# Patient Record
Sex: FEMALE | HISPANIC OR LATINO | Employment: FULL TIME | URBAN - METROPOLITAN AREA
[De-identification: names, ages, dates, MRNs, and addresses within clinical notes are randomized per-mention and may not be internally consistent; named-entity substitution may affect disease eponyms.]

---

## 2017-02-14 ENCOUNTER — OFFICE VISIT (OUTPATIENT)
Dept: FAMILY MEDICINE | Facility: CLINIC | Age: 22
End: 2017-02-14
Payer: COMMERCIAL

## 2017-02-14 VITALS
SYSTOLIC BLOOD PRESSURE: 131 MMHG | WEIGHT: 145 LBS | BODY MASS INDEX: 24.75 KG/M2 | HEART RATE: 107 BPM | HEIGHT: 64 IN | OXYGEN SATURATION: 100 % | DIASTOLIC BLOOD PRESSURE: 78 MMHG | TEMPERATURE: 98 F

## 2017-02-14 DIAGNOSIS — Z32.01 PREGNANCY TEST POSITIVE: Primary | ICD-10-CM

## 2017-02-14 DIAGNOSIS — N91.2 AMENORRHEA: ICD-10-CM

## 2017-02-14 LAB — BETA HCG QUAL IFA URINE: POSITIVE

## 2017-02-14 PROCEDURE — 99213 OFFICE O/P EST LOW 20 MIN: CPT | Performed by: PREVENTIVE MEDICINE

## 2017-02-14 PROCEDURE — 84703 CHORIONIC GONADOTROPIN ASSAY: CPT | Performed by: PREVENTIVE MEDICINE

## 2017-02-14 RX ORDER — SWAB
1 SWAB, NON-MEDICATED MISCELLANEOUS DAILY
Qty: 90 EACH | Refills: 1 | Status: SHIPPED | OUTPATIENT
Start: 2017-02-14 | End: 2018-08-16

## 2017-02-14 ASSESSMENT — PAIN SCALES - GENERAL: PAINLEVEL: NO PAIN (0)

## 2017-02-14 NOTE — PROGRESS NOTES
SUBJECTIVE:                                                    Birdie Ellis is a 21 year old female who presents to clinic today for the following health issues:      Here for confirmation of pregnancy.  No abdominal pain, no dysuria or frequency, no vaginal spotting or bleeding.  No nausea or emesis.  Not taking prenatal vitamins.  No use of tobacco or alcohol. Needs referral to OB/GYN. Unplanned pregnancy.     Problem list and histories reviewed & adjusted, as indicated.  Additional history: as documented    Patient Active Problem List   Diagnosis     CARDIOVASCULAR SCREENING; LDL GOAL LESS THAN 160     Learning disability     Cold sore     Past Surgical History   Procedure Laterality Date     No history of surgery         Social History   Substance Use Topics     Smoking status: Never Smoker     Smokeless tobacco: Never Used     Alcohol use No     Family History   Problem Relation Age of Onset     DIABETES Mother      DIABETES Father      Breast Cancer Maternal Aunt      in late 30's early 40's     Breast Cancer Other 30     maternal cousin     Hypertension No family hx of      CEREBROVASCULAR DISEASE No family hx of      Thyroid Disease No family hx of      Glaucoma No family hx of      Macular Degeneration No family hx of      Depression Mother      Anxiety Disorder No family hx of      Asthma Mother      Bleeding Disorder No family hx of      Liver Disease No family hx of          Current Outpatient Prescriptions   Medication Sig Dispense Refill     Prenatal Vit-Fe Fumarate-FA (PRENATAL MULTIVITAMIN  WITH IRON) 28-0.8 MG TABS Take 1 tablet by mouth daily 90 each 1     Allergies   Allergen Reactions     Pollen Extract      BP Readings from Last 3 Encounters:   02/14/17 131/78   12/12/16 124/67   09/19/16 118/79    Wt Readings from Last 3 Encounters:   02/14/17 145 lb (65.8 kg)   12/12/16 147 lb (66.7 kg)   09/19/16 138 lb 9.6 oz (62.9 kg)                    ROS:  Constitutional, HEENT, cardiovascular,  "pulmonary, gi and gu systems are negative, except as otherwise noted.    OBJECTIVE:                                                    /78 (BP Location: Right arm, Cuff Size: Adult Regular)  Pulse 107  Temp 98  F (36.7  C) (Oral)  Ht 5' 3.5\" (1.613 m)  Wt 145 lb (65.8 kg)  LMP 01/03/2017 (Exact Date)  SpO2 100%  Breastfeeding? Unknown  BMI 25.28 kg/m2  Body mass index is 25.28 kg/(m^2).  GENERAL APPEARANCE: healthy, alert and no distress  EYES: Eyes grossly normal to inspection and conjunctivae and sclerae normal  NECK: no adenopathy and no asymmetry, masses, or scars  RESP: Clear to auscultation bilaterally   CV: regular rates and rhythm, normal S1 S2, no S3 or S4 and no murmur, click or rub  ABDOMEN: no rebound or guarding   MS: extremities normal- no gross deformities noted  SKIN: no suspicious lesions or rashes  NEURO: Normal strength and tone, mentation intact and speech normal  PSYCH: mentation appears normal    Diagnostic test results:  Diagnostic Test Results:  Results for orders placed or performed in visit on 02/14/17 (from the past 24 hour(s))   Beta HCG qual IFA urine   Result Value Ref Range    Beta HCG Qual IFA Urine Positive (A) NEG        ASSESSMENT/PLAN:                                                    1. Pregnancy test positive  -LMP 1/3/17  -Not using any contraception  -Primigravida  -RODERICK 10/10/17  -6 weeks today   - OB/GYN REFERRAL  -Information on pregnancy provided   - Prenatal Vit-Fe Fumarate-FA (PRENATAL MULTIVITAMIN  WITH IRON) 28-0.8 MG TABS; Take 1 tablet by mouth daily  Dispense: 90 each; Refill: 1    2. Amenorrhea  - Beta HCG qual IFA urine      Follow up with Provider - Establish with OB/GYN for prenatal care    Due for pap smear, will get with GYN     Cassidy Hayes MD MPH    [facility]    "

## 2017-02-14 NOTE — PROGRESS NOTES
Results discussed directly with patient while patient was present. Any further details documented in the note.   Cassidy Hayes MD

## 2017-02-14 NOTE — PATIENT INSTRUCTIONS
At [facility], we strive to deliver an exceptional experience to you, every time we see you.    If you receive a survey in the mail, please send us back your thoughts. We really do value your feedback.    Thank you for visiting [facility]    Normal or non-critical lab and imaging results will be communicated to you by MyChart, letter or phone within 7 days.  If you do not hear from us within 10 days, please call the clinic. If you have a critical or abnormal lab result, we will notify you by phone as soon as possible.     If you have any questions regarding your visit please contact:     Team [facility]  Clinic Hours Telephone Number   Dr. Jelly Alatorre   7am-7pm  Monday through Thursday  7am-5pm Friday [phone]  Alyssa MEDEROS RN   Pharmacy 8:30am-9pm Monday-Friday    9am-5pm Saturday-Sunday [phone]   Urgent Care 11am-9pm Monday-Friday        9am-5pm Saturday-Sunday [phone]     After hours, weekend or if you need to make an appointment with your primary provider please call [phone].   After Hours nurse advise: call [facility]: [phone]    Use [employer] (secure email communication and access to your chart) to send your primary care provider a message or make an appointment. Ask someone on your Team how to sign up for [employer]. To log on to [employer] or for more information in [employer] please visit the website at [email or web address].   As of October 8, 2013, all password changes, disabled accounts, or ID changes in [employer]/MyHealth will be done by our Access Services Department.   If you need help with your account or password, call: [phone]. Clinic staff no longer has the ability to change passwords.     Embarazo    El examen que se le hizo hoy indica que usted está embarazada.  Síntomas de embarazo  Raymond el embarazo, las  hormonas de oh cuerpo cambian. [place] provoca cambios físicos y emocionales. Es normal. Saber qué esperar la tranquilizará y sabrá cuándo buscar ayuda si tiene algún problema. Estos son algunos de los síntomas más comunes:    Náuseas por la mañana. En realidad, las náuseas pueden darse en cualquier momento del día o la noche    Sensibilidad e inflamación de los senos    Necesidad de orinar con más frecuencia    Fatiga o cansancio    Mareo    Indigestión o acidez estomacal    Deseos de comer ciertos alimentos o sentir asco por otros    Estreñimiento    Cambios emocionales. Estos cambios pueden ir desde ansiedad a excitación y depresión.  Indicaciones para tener un embarazo saludable  Aquí tiene algunas cosas que puede hacer para asegurarse de tener un bebé triston:    Descanse cuando se sienta cansada. Especialmente, angela los últimos meses del embarazo.    Maria T más líquidos. Oh cuerpo necesita más líquidos de los que usted quizás acostumbre a consumir. Maria T entre ocho y williams vasos de jugo, leche o agua todos los días.    Coma de manera balanceada. Coma a horarios regulares para darle a oh cuerpo proteínas suficientes. Es de esperarse que aumente alrededor de 30 libras (15 kilos) angela el embarazo. No intente hacer dieta ni bajar de peso mientras esté embarazada.    Felton marie vitamina prenatal al día. Eso le ayudará a satisfacer las necesidades de mayor nutrición que tiene el embarazo.    No tome ningún otro medicamento mientras esté embarazada a menos que oh médico le indique hacerlo. No tome ni siquiera medicamentos recetados ni de venta [place]. Muchos medicamentos pueden dañar al bebé que está creciendo en oh vientre.    Si tiene náuseas o vómito, no coma alimentos grasosos ni fritos. Coma varias comidas más pequeñas a lo jori del día en lugar de gil comidas grandes.    Si fuma, debe dejarlo. La nicotina que usted respira va directo al bebé.    No tome alcohol, ni siquiera en cantidades moderadas. Si dominga a diario,  perjudicará la demetria de oh bebé y puede provocarle daño cerebral permanente.    No use drogas recreativas, especialmente, cocaína, crack y heroína. Esas dañarán a oh bebé. Evite también la marihuana.    Si estaba consumiendo drogas recreativas o medicamentos recetados cuando se enteró de que estaba embarazada, hable con oh proveedor de atención médica respecto de los posibles efectos que ello podría tener sobre el bebé que está creciendo.    Si tiene algún problema médico para el que necesite adrián medicamentos, hable con oh médico.  Visita de control  Llame a oh proveedor de atención médica para coordinar la atención prenatal. La atención médica prenatal es importante. Puede agustina a un médico de bill, a un especialista en embarazo (obstetra) o a un clínico de atención médica primaria.   Cuándo debe buscar atención médica?  Llame a oh proveedor de atención médica de inmediato si sucede cualquiera de las siguientes cosas:    Sangrado vaginal    Dolor en oh abdomen o en oh espalda que es moderado o muy yvette    Vomita mucho o no puede mantener ningún líquido en el estómago por seis horas    Ardor al orinar    Dolor de brittany, mareos o rápido aumento de peso    Fiebre    Cambios en oh visión o visión borrosa    [id number] [employer]. [address] [place] [address], [place] [address]. Todos los derechos reservados. Esta información no pretende sustituir la atención médica profesional. Sólo oh médico puede diagnosticar y tratar un problema de demetria.        Pregnancy: Common Questions  There are plenty of myths and  old wives  tales  surrounding pregnancy. You may need help  fact from fiction. On this sheet, you ll find answers to a few common questions. If you have other questions, talk with your healthcare provider.    Will working harm my baby?  In most cases, working throughout your pregnancy is not harmful at all. There may be concerns if the job involves dangerous machinery or chemicals, lifting,  or standing for very long periods of time. Talk to your healthcare provider and employer about your particular job and pregnancy.  Is it safe to have sexual relations during pregnancy?  Yes, unless you are specifically instructed not to by your healthcare provider.  Why can t I change the cat litter box?  Cats carry a disease called toxoplasmosis. In adult humans, it shows up as a mild infection of the blood and organs. If you are infected during pregnancy, the baby s brain and eyes could be damaged. To be safe, have someone else change the litter. If you must handle it, wear a paper mask over your nose and mouth. Also, wear gloves and wash your hands afterward.  Which medicines are safe?  No prescription or over-the-counter medicine is safe for everyone all of the time. But sometimes medicines are needed.  Be sure your healthcare provider knows you are pregnant. Then use only the medicines he or she advises you to take.  Is it true that I can overheat my baby?  Yes. To avoid making your baby too warm:    Don t sit in a Jacuzzi. A long, warm bath is fine, but not in water over 100 F.    Exercise less intensely if you feel fatigued. Base your workout on how you feel, not your heart rate. Heart rates aren t a good way to measure effort during pregnancy.  Can I lift and carry safely?  Yes, if your healthcare provider doesn t tell you otherwise. Learn to lift and carry safely to avoid injury and reduce back pain during pregnancy. To protect your back:    Bend at the knees to bring the load nearer.    Get a good . Test the weight of the load.    Tighten your abdomen. Exhale as you lift.    Lift with your legs, not with your back.    Carry the load close to your body.    Hold the load so you can see where you are going.  What if I get sick?  Most women get sick at least once during pregnancy. Talk with your healthcare provider if you do. Most likely it will not affect your pregnancy. Get plenty of rest and fluids, and  eat what you can. Talk to your healthcare provider before taking any medicines.    [id number] [employer]. [address] [place] [address], [place] [address]. All rights reserved. This information is not intended as a substitute for professional medical care. Always follow your healthcare professional's instructions.        Pregnancy    Your exam today shows that you are pregnant.  Pregnancy symptoms  During pregnancy your body s hormones change. This causes physical and emotional changes. This is normal. Knowing what to expect is important for your piece of mind and so you know when to seek help for a problem. Here are some of the most common symptoms:    Morning sickness or nausea. This can happen any time of the day or night.    Tender, swollen breasts    Need to urinate frequently    Tiredness or fatigue    Dizziness    Indigestion or heartburn    Food cravings or turn-offs    Constipation    Emotional changes. This can range from anxiety to excitement to depression.  Guidelines for a healthy pregnancy  Here are things you can do to help make sure your baby is born healthy:    Rest when you feel tired. This is especially true in the later months of pregnancy.    Drink more fluids. Your body needs more fluids than you may be used to. Drink 8 to10 glasses of juice, milk, or water every day.    Eat well-balanced meals. Eat at regular times to give your body enough protein. You can expect to gain about 30 pounds during the pregnancy. Don t try to diet or lose weight while you are pregnant.    Take 1 prenatal vitamin every day. This helps give you meet the extra nutritional needs of pregnancy.    Don t take any other medicine during your pregnancy unless your doctor tells you to. This includes prescription medicines and those you buy over the counter. Many drugs can harm the growing baby.    If you have nausea or vomiting, don t eat greasy or fried foods. Eat several smaller meals throughout the day rather than 3  large meals.    If you smoke, you must stop. The nicotine you breathe in goes right to the baby.    Stay away from alcohol, even in moderate amounts. Daily drinking will harm your baby and can cause permanent brain damage.    Don t use recreational drugs, especially cocaine, crack, and heroin. These will harm your baby. Also avoid marijuana.    If you were using recreational drugs or prescribed medicine when you found out that you were pregnant, talk with your health care provider about possible effects on your growing baby.    If you have medical problems that you need to take medicine for, talk with your doctor about this.  Follow-up care  Call your health care provider to arrange for prenatal care. Prenatal care is important. You can see your family doctor, a pregnancy specialist (obstetrician), or a primary care clinic.  When to seek medical advice  Call your health care provider right away if any of these occur:    Vaginal bleeding    Pain in your belly (abdomen) or back that is moderate or severe    Lots of vomiting, or  you can t keep any fluids down for 6 hours    Burning feeling when you urinate    Headache, dizziness, or rapid weight gain    Fever    Vision changes or blurred vision       [phone] [employer]. [address] [place] [address]. All rights reserved. This information is not intended as a substitute for professional medical care. Always follow your healthcare professional's instructions.

## 2017-02-14 NOTE — NURSING NOTE
"Chief Complaint   Patient presents with     Confirmation Of Pregnancy       Initial /78 (BP Location: Right arm, Cuff Size: Adult Regular)  Pulse 107  Temp 98  F (36.7  C) (Oral)  Ht 5' 3.5\" (1.613 m)  Wt 145 lb (65.8 kg)  LMP 01/03/2017 (Exact Date)  SpO2 100%  Breastfeeding? Unknown  BMI 25.28 kg/m2 Estimated body mass index is 25.28 kg/(m^2) as calculated from the following:    Height as of this encounter: 5' 3.5\" (1.613 m).    Weight as of this encounter: 145 lb (65.8 kg).  Medication Reconciliation: complete   Sunitha CAMERON        "

## 2017-02-14 NOTE — MR AVS SNAPSHOT
After Visit Summary   2/14/2017    Birdie Ellis    MRN: [id number]           Patient Information     Date Of Birth          1995        Visit Information        Provider Department      2/14/2017 4:20 PM Cassidy Hayes MD [facility]        Today's Diagnoses     Pregnancy test positive    -  1    Amenorrhea          Care Instructions    At [facility], we strive to deliver an exceptional experience to you, every time we see you.    If you receive a survey in the mail, please send us back your thoughts. We really do value your feedback.    Thank you for visiting [facility]    Normal or non-critical lab and imaging results will be communicated to you by MyChart, letter or phone within 7 days.  If you do not hear from us within 10 days, please call the clinic. If you have a critical or abnormal lab result, we will notify you by phone as soon as possible.     If you have any questions regarding your visit please contact:     Team [facility]  Clinic Hours Telephone Number   Dr. Jelly Alatorre   7am-7pm  Monday through Thursday  7am-5pm Friday [phone]  Alyssa MEDEROS RN   Pharmacy 8:30am-9pm Monday-Friday    9am-5pm Saturday-Sunday [phone]   Urgent Care 11am-9pm Monday-Friday        9am-5pm Saturday-Sunday [phone]     After hours, weekend or if you need to make an appointment with your primary provider please call [phone].   After Hours nurse advise: call [facility]: [phone]    Use SurIDxhart (secure email communication and access to your chart) to send your primary care provider a message or make an appointment. Ask someone on your Team how to sign up for YelloYello. To log on to [email or web address] or for more information in [employer]) please visit the website at [email or web address].   As of October 8,  2013, all password changes, disabled accounts, or ID changes in CAISt/MyHealth will be done by our Access Services Department.   If you need help with your account or password, call: [phone]. Clinic staff no longer has the ability to change passwords.     Embarazo    El examen que se le hizo hoy indica que usted está embarazada.  Síntomas de embarazo  Angela el embarazo, las hormonas de oh cuerpo cambian. [place] provoca cambios físicos y emocionales. Es normal. Saber qué esperar la tranquilizará y sabrá cuándo buscar ayuda si tiene algún problema. Estos son algunos de los síntomas más comunes:    Náuseas por la mañana. En realidad, las náuseas pueden darse en cualquier momento del día o la noche    Sensibilidad e inflamación de los senos    Necesidad de orinar con más frecuencia    Fatiga o cansancio    Mareo    Indigestión o acidez estomacal    Deseos de comer ciertos alimentos o sentir asco por otros    Estreñimiento    Cambios emocionales. Estos cambios pueden ir desde ansiedad a excitación y depresión.  Indicaciones para tener un embarazo saludable  Aquí tiene algunas cosas que puede hacer para asegurarse de tener un bebé triston:    Descanse cuando se sienta cansada. Especialmente, angela los últimos meses del embarazo.    Maria T más líquidos. Oh cuerpo necesita más líquidos de los que usted quizás acostumbre a consumir. Maria T entre ocho y williams vasos de jugo, leche o agua todos los días.    Coma de manera balanceada. Coma a horarios regulares para darle a oh cuerpo proteínas suficientes. Es de esperarse que aumente alrededor de 30 libras (15 kilos) angela el embarazo. No intente hacer dieta ni bajar de peso mientras esté embarazada.    [place] marie vitamina prenatal al día. Eso le ayudará a satisfacer las necesidades de mayor nutrición que tiene el embarazo.    No tome ningún otro medicamento mientras esté embarazada a menos que oh médico le indique hacerlo. No tome ni siquiera medicamentos recetados ni de  venta india. Muchos medicamentos pueden dañar al bebé que está creciendo en oh vientre.    Si tiene náuseas o vómito, no coma alimentos grasosos ni fritos. Coma varias comidas más pequeñas a lo jori del día en lugar de gil comidas grandes.    Si fuma, debe dejarlo. La nicotina que usted respira va directo al bebé.    No tome alcohol, ni siquiera en cantidades moderadas. Si dominga a diario, perjudicará la demetria de oh bebé y puede provocarle daño cerebral permanente.    No use drogas recreativas, especialmente, cocaína, crack y heroína. Esas dañarán a oh bebé. Evite también la marihuana.    Si estaba consumiendo drogas recreativas o medicamentos recetados cuando se enteró de que estaba embarazada, hable con oh proveedor de atención médica respecto de los posibles efectos que ello podría tener sobre el bebé que está creciendo.    Si tiene algún problema médico para el que necesite adrián medicamentos, hable con oh médico.  Visita de control  Llame a oh proveedor de atención médica para coordinar la atención prenatal. La atención médica prenatal es importante. Puede agustina a un médico de bill, a un especialista en embarazo (obstetra) o a un clínico de atención médica primaria.   Cuándo debe buscar atención médica?  Llame a oh proveedor de atención médica de inmediato si sucede cualquiera de las siguientes cosas:    Sangrado vaginal    Dolor en oh abdomen o en oh espalda que es moderado o muy yvette    Vomita mucho o no puede mantener ningún líquido en el estómago por seis horas    Ardor al orinar    Dolor de brittany, mareos o rápido aumento de peso    Fiebre    Cambios en oh visión o visión borrosa    4529-4585 [employer]. [address] [place] [address], [place] [address]. Todos los derechos reservados. Esta información no pretende sustituir la atención médica profesional. Sólo oh médico puede diagnosticar y tratar un problema de demetria.        Pregnancy: Common Questions  There are plenty of myths and  old wives   tales  surrounding pregnancy. You may need help  fact from fiction. On this sheet, you ll find answers to a few common questions. If you have other questions, talk with your healthcare provider.    Will working harm my baby?  In most cases, working throughout your pregnancy is not harmful at all. There may be concerns if the job involves dangerous machinery or chemicals, lifting, or standing for very long periods of time. Talk to your healthcare provider and employer about your particular job and pregnancy.  Is it safe to have sexual relations during pregnancy?  Yes, unless you are specifically instructed not to by your healthcare provider.  Why can t I change the cat litter box?  Cats carry a disease called toxoplasmosis. In adult humans, it shows up as a mild infection of the blood and organs. If you are infected during pregnancy, the baby s brain and eyes could be damaged. To be safe, have someone else change the litter. If you must handle it, wear a paper mask over your nose and mouth. Also, wear gloves and wash your hands afterward.  Which medicines are safe?  No prescription or over-the-counter medicine is safe for everyone all of the time. But sometimes medicines are needed.  Be sure your healthcare provider knows you are pregnant. Then use only the medicines he or she advises you to take.  Is it true that I can overheat my baby?  Yes. To avoid making your baby too warm:    Don t sit in a Jacuzzi. A long, warm bath is fine, but not in water over 100 F.    Exercise less intensely if you feel fatigued. Base your workout on how you feel, not your heart rate. Heart rates aren t a good way to measure effort during pregnancy.  Can I lift and carry safely?  Yes, if your healthcare provider doesn t tell you otherwise. Learn to lift and carry safely to avoid injury and reduce back pain during pregnancy. To protect your back:    Bend at the knees to bring the load nearer.    Get a good . Test the weight  of the load.    Tighten your abdomen. Exhale as you lift.    Lift with your legs, not with your back.    Carry the load close to your body.    Hold the load so you can see where you are going.  What if I get sick?  Most women get sick at least once during pregnancy. Talk with your healthcare provider if you do. Most likely it will not affect your pregnancy. Get plenty of rest and fluids, and eat what you can. Talk to your healthcare provider before taking any medicines.    6275-0450 [employer]. [address] [place] [address], [place] [address]. All rights reserved. This information is not intended as a substitute for professional medical care. Always follow your healthcare professional's instructions.        Pregnancy    Your exam today shows that you are pregnant.  Pregnancy symptoms  During pregnancy your body s hormones change. This causes physical and emotional changes. This is normal. Knowing what to expect is important for your piece of mind and so you know when to seek help for a problem. Here are some of the most common symptoms:    Morning sickness or nausea. This can happen any time of the day or night.    Tender, swollen breasts    Need to urinate frequently    Tiredness or fatigue    Dizziness    Indigestion or heartburn    Food cravings or turn-offs    Constipation    Emotional changes. This can range from anxiety to excitement to depression.  Guidelines for a healthy pregnancy  Here are things you can do to help make sure your baby is born healthy:    Rest when you feel tired. This is especially true in the later months of pregnancy.    Drink more fluids. Your body needs more fluids than you may be used to. Drink 8 to10 glasses of juice, milk, or water every day.    Eat well-balanced meals. Eat at regular times to give your body enough protein. You can expect to gain about 30 pounds during the pregnancy. Don t try to diet or lose weight while you are pregnant.    Take 1 prenatal vitamin every  day. This helps give you meet the extra nutritional needs of pregnancy.    Don t take any other medicine during your pregnancy unless your doctor tells you to. This includes prescription medicines and those you buy over the counter. Many drugs can harm the growing baby.    If you have nausea or vomiting, don t eat greasy or fried foods. Eat several smaller meals throughout the day rather than 3 large meals.    If you smoke, you must stop. The nicotine you breathe in goes right to the baby.    Stay away from alcohol, even in moderate amounts. Daily drinking will harm your baby and can cause permanent brain damage.    Don t use recreational drugs, especially cocaine, crack, and heroin. These will harm your baby. Also avoid marijuana.    If you were using recreational drugs or prescribed medicine when you found out that you were pregnant, talk with your health care provider about possible effects on your growing baby.    If you have medical problems that you need to take medicine for, talk with your doctor about this.  Follow-up care  Call your health care provider to arrange for prenatal care. Prenatal care is important. You can see your family doctor, a pregnancy specialist (obstetrician), or a primary care clinic.  When to seek medical advice  Call your health care provider right away if any of these occur:    Vaginal bleeding    Pain in your belly (abdomen) or back that is moderate or severe    Lots of vomiting, or  you can t keep any fluids down for 6 hours    Burning feeling when you urinate    Headache, dizziness, or rapid weight gain    Fever    Vision changes or blurred vision       9790-7395 [employer]. [address] [place] [address], [place] [address]. All rights reserved. This information is not intended as a substitute for professional medical care. Always follow your healthcare professional's instructions.              Follow-ups after your visit        Additional Services     OB/GYN REFERRAL        "Your provider has referred you to:  FM[facility]: [facility] [phone]   [email or web address]     Please be aware that coverage of these services is subject to the terms and limitations of your health insurance plan.  Call member services at your health plan with any benefit or coverage questions.      Please bring the following with you to your appointment:    (1) Any X-Rays, CTs or MRIs which have been performed.  Contact the facility where they were done to arrange for  prior to your scheduled appointment.   (2) List of current medications   (3) This referral request   (4) Any documents/labs given to you for this referral                  Who to contact     If you have questions or need follow up information about today's clinic visit or your schedule please contact [facility] [place] directly at [phone].  Normal or non-critical lab and imaging results will be communicated to you by MyChart, letter or phone within 4 business days after the clinic has received the results. If you do not hear from us within 7 days, please contact the clinic through MyChart or phone. If you have a critical or abnormal lab result, we will notify you by phone as soon as possible.  Submit refill requests through Angle or call your pharmacy and they will forward the refill request to us. Please allow 3 business days for your refill to be completed.          Additional Information About Your Visit        Angle Information     Angle lets you send messages to your doctor, view your test results, renew your prescriptions, schedule appointments and more. To sign up, go to [email or web address] . Click on \"Log in\" on the left side of the screen, which will take you to the Welcome page. Then click on \"Sign up Now\" on the right side of the page.     You will be asked to enter the access code listed below, as well as some personal information. Please " "follow the directions to create your username and password.     Your access code is: [email or web address]  Expires: 5/15/2017  4:43 PM     Your access code will  in 90 days. If you need help or a new code, please call your [facility] clinic or [phone].        Care EveryWhere ID     This is your Care EveryWhere ID. This could be used by other organizations to access your [facility] medical records  [id number]        Your Vitals Were     Pulse Temperature Height Last Period Pulse Oximetry Breastfeeding?    107 98  F (36.7  C) (Oral) 5' 3.5\" (1.613 m) 2017 (Exact Date) 100% Unknown    BMI (Body Mass Index)                   25.28 kg/m2            Blood Pressure from Last 3 Encounters:   17 131/78   16 124/67   16 118/79    Weight from Last 3 Encounters:   17 145 lb (65.8 kg)   16 147 lb (66.7 kg)   16 138 lb 9.6 oz (62.9 kg)              We Performed the Following     Beta HCG qual IFA urine     OB/GYN REFERRAL          Today's Medication Changes          These changes are accurate as of: 17  4:43 PM.  If you have any questions, ask your nurse or doctor.               Start taking these medicines.        Dose/Directions    prenatal multivitamin  with iron 28-0.8 MG Tabs   Used for:  Pregnancy test positive   Started by:  Cassidy Hayes MD        Dose:  1 tablet   Take 1 tablet by mouth daily   Quantity:  90 each   Refills:  1         Stop taking these medicines if you haven't already. Please contact your care team if you have questions.     levonorgestrel-ethinyl estradiol 0.15-30 MG-MCG per tablet   Commonly known as:  NORDETTE   Stopped by:  Cassidy Hayes MD           terbinafine 250 MG tablet   Commonly known as:  lamISIL   Stopped by:  Cassidy Hayes MD           valACYclovir 1000 mg tablet   Commonly known as:  VALTREX   Stopped by:  Cassidy Hayes MD                Where to get your medicines      These medications were sent to [id number] - " [place], [place] - [address]  [address], [facility] [place] [address]    Hours:  24-hours Phone:  [phone]     prenatal multivitamin  with iron 28-0.8 MG Tabs                Primary Care Provider Office Phone # Fax #    Jolie An Loaiza PA-C [phone]       [facility] [address]  [facility] [place] [address]        Thank you!     Thank you for choosing [facility]  for your care. Our goal is always to provide you with excellent care. Hearing back from our patients is one way we can continue to improve our services. Please take a few minutes to complete the written survey that you may receive in the mail after your visit with us. Thank you!             Your Updated Medication List - Protect others around you: Learn how to safely use, store and throw away your medicines at www.disposemymeds.org.          This list is accurate as of: 2/14/17  4:43 PM.  Always use your most recent med list.                   Brand Name Dispense Instructions for use    prenatal multivitamin  with iron 28-0.8 MG Tabs     90 each    Take 1 tablet by mouth daily

## 2017-03-07 DIAGNOSIS — Z32.01 PREGNANCY TEST POSITIVE: Primary | ICD-10-CM

## 2017-06-20 ENCOUNTER — OFFICE VISIT (OUTPATIENT)
Dept: FAMILY MEDICINE | Facility: CLINIC | Age: 22
End: 2017-06-20
Payer: MEDICAID

## 2017-06-20 VITALS
HEIGHT: 64 IN | BODY MASS INDEX: 26.63 KG/M2 | WEIGHT: 156 LBS | OXYGEN SATURATION: 99 % | SYSTOLIC BLOOD PRESSURE: 112 MMHG | DIASTOLIC BLOOD PRESSURE: 69 MMHG | RESPIRATION RATE: 17 BRPM | TEMPERATURE: 98 F | HEART RATE: 87 BPM

## 2017-06-20 DIAGNOSIS — Z33.1 PREGNANCY, INCIDENTAL: ICD-10-CM

## 2017-06-20 DIAGNOSIS — K06.9 GINGIVA DISORDER: Primary | ICD-10-CM

## 2017-06-20 PROCEDURE — 99213 OFFICE O/P EST LOW 20 MIN: CPT | Performed by: PHYSICIAN ASSISTANT

## 2017-06-20 ASSESSMENT — PAIN SCALES - GENERAL: PAINLEVEL: MODERATE PAIN (4)

## 2017-06-20 NOTE — NURSING NOTE
"Chief Complaint   Patient presents with     Mouth/Lip Problem       Initial /69 (BP Location: Left arm, Patient Position: Chair, Cuff Size: Adult Regular)  Pulse 87  Temp 98  F (36.7  C) (Oral)  Resp 17  Ht 5' 3.5\" (1.613 m)  Wt 156 lb (70.8 kg)  LMP 01/03/2017 (Exact Date)  SpO2 99%  Breastfeeding? No  BMI 27.2 kg/m2 Estimated body mass index is 27.2 kg/(m^2) as calculated from the following:    Height as of this encounter: 5' 3.5\" (1.613 m).    Weight as of this encounter: 156 lb (70.8 kg).  Medication Reconciliation: complete     Selma Parker MA       "

## 2017-06-20 NOTE — PROGRESS NOTES
"  SUBJECTIVE:                                                    Birdie Ellis is a 21 year old female who presents to clinic today for the following health issues:    Concern - Gingival discomfort and bleeding     Onset: 5 days    Description:   Inside lower lip    Intensity: moderate    Progression of Symptoms:  same    Accompanying Signs & Symptoms:  Discomfort and bleeding gums when brushing teeth and flossing        Previous history of similar problem:   no    Precipitating factors:   Worsened by: brushing teeth and eating    Alleviating factors:  Improved by: none       Therapies Tried and outcome: salt water rinse and mouth wash    Patient presents for evaluation of intermittent gingival discomfort bleeding for the past 5 days. Patient reports she has bleeding onset when she is brushing her teeth and flossing along with associated discomfort. Also will experience these symptoms when eating certain foods such as chips. Patient feels as if her gums are \"swollen\" and irritated more than usual. She has tried salt water gargles and baking soda with lime without much improvement. Also tried using mouthwash but did not continue using this as it caused \"stinging and burning\" in inflamed areas. Patient does not follow with dentist 2x per year; states it has been at least 1-2 years since she last had dentist appointment. Denies similar presentation in the past. Denies tooth pain, ulcers, lesions, fever, chills, sore throat, tongue swelling, or difficulty breathing. She does not smoke cigarettes.     Patient reports she had a miscarriage in February 2017. She recently found out she is pregnant again, states she is ~5 weeks along. She is following with OBGYN. Reports she is taking prenatal vitamins. Complains of some back pain related to pregnancy and wondering what medications are safe to take at this time.     Allergies   Allergen Reactions     Pollen Extract        Past Medical History:   Diagnosis Date     " "Learning disability          Current Outpatient Prescriptions on File Prior to Visit:  Prenatal Vit-Fe Fumarate-FA (PRENATAL MULTIVITAMIN  WITH IRON) 28-0.8 MG TABS Take 1 tablet by mouth daily     No current facility-administered medications on file prior to visit.     Social History   Substance Use Topics     Smoking status: Never Smoker     Smokeless tobacco: Never Used     Alcohol use No       ROS:  General: negative for fever or chills   Ears: negative for ear pain   Eyes: negative for eye pain or discharge   Nose: negative for rhinorrhea   Mouth: positive for gingival bleeding and discomfort as above, negative for sore throat or mucosal lesions  Resp: negative for difficulty breathing   CV: negative for chest pain  ABD: negative for abd pain, nausea, or vomiting   Neurologic:negative for headache    OBJECTIVE:  /69 (BP Location: Left arm, Patient Position: Chair, Cuff Size: Adult Regular)  Pulse 87  Temp 98  F (36.7  C) (Oral)  Resp 17  Ht 5' 3.5\" (1.613 m)  Wt 156 lb (70.8 kg)  LMP 01/03/2017 (Exact Date)  SpO2 99%  Breastfeeding? No  BMI 27.2 kg/m2   General:   awake, alert, and cooperative.  NAD.   Head: Normocephalic, atraumatic.   Eyes: Conjunctiva clear, PERRL.  Ears: Pearly grey TMs bilaterally.  Nose: Nares patent without discharge.   Mouth/Throat: Erythematous and edematous gingival tissues with plaque present, mildly tender to palpation, no bleeding, no abscess or fluctuance, no mucosal lesions or ulcerations.   Neck: Supple, no adenopathy.   Heart: Regular rate and rhythm. No murmur.  Lungs: Chest is clear; no wheezes or rales.   Neuro: Alert and oriented - normal speech.    ASSESSMENT:  Gingivitis   Pregnancy, first trimester     PLAN:   Presentation consistent with gingivitis. Explained to patient this may be exacerbated in setting of pregnancy secondary to hormonal shifts causing increased gingival inflammation.   Recommended salt water gargles and using alcohol-free mouthwash. " Also discussed proper dental hygiene including teeth brushing and flossing daily.  Instructed patient to follow up with dentist as soon as possible for thorough cleaning and evaluation.     Patient is experiencing some back pain likely related to pregnancy. Recommended using tylenol as this is safe. Also instructed her to continue taking prenatal vitamins. Advised her to follow up with OBGYN as scheduled.     Advised about symptoms which might herald more serious problems.    Patient verbalized understanding and agreement with treatment and discharge plan.     Joselito Nichole PA-C

## 2017-06-20 NOTE — PATIENT INSTRUCTIONS
How to contact your care team: [phone] Pharmacy [phone]   MD HERSON VENTURA PA-C CHRIS JONES, PA-C NAM HO, MD JONATHAN BATES, MD ARVIN VOCAL, MD    Clinic hours M-Th 7am-7pm Fri 7am-5pm.   Urgent care M-F 11am-9pm  Sat/Sun 9am-5pm.   Pharmacy   Mon-Th:  8:00am-8pm   Fri:  8:00am-6:00pm  Sat/Sun  8:00am-5:00 pm       Stages of Periodontal Disease  Periodontal disease is an infection of the gums and tissues supporting the teeth. If not treated, it often gets worse. Bone damage and tooth loss can occur. Regular self-care and dental visits can help prevent or control periodontal disease.     Gngivitis       Periodontitis       Advanced periodontitis      Gingivitis  This is the mildest form of periodontal disease. The gum becomes irritated and swollen (inflamed). The space between the gum and tooth gets deeper, forming a pocket. Gums may become red and may bleed. Or, there may be no symptoms. Left untreated, it can progress to periodontitis.  Periodontitis  Infection and inflammation spread to the bone supporting the teeth. Gums may recede (shrink back) from the teeth. Pockets between the teeth can get deeper and harder to clean. Redness, swelling, and bleeding may develop or get worse. Infection begins to destroy the bone. As bone is destroyed, teeth may start to feel loose.  Advanced periodontitis  As periodontitis advances, pockets deepen even more and can fill with pus. Around the roots of the teeth, the gums may start to swell. Bone loss continues. The teeth may feel sensitive to heat or cold and may hurt when brushed. Teeth loosen more. In some cases, teeth may need to be removed to keep the disease from spreading.  Date Last Reviewed: 7/3/2015    7593-8358 The "[employer]". [address] [place] [address], [place] [address]. All rights reserved. This information is not intended as a substitute for professional medical care. Always follow your healthcare professional's  instructions.

## 2017-06-20 NOTE — MR AVS SNAPSHOT
After Visit Summary   6/20/2017    Birdie Ellis    MRN: [id number]           Patient Information     Date Of Birth          1995        Visit Information        Provider Department      6/20/2017 11:00 AM Roberto Nichole PA [facility]        Care Instructions    How to contact your care team: [phone] Pharmacy [phone]   MD HERSON VENTURA PA-C CHRIS JONES, PA-C NAM HO, MD JONATHAN BATES, MD ARVIN VOCAL, MD    Clinic hours M-Th 7am-7pm Fri 7am-5pm.   Urgent care M-F 11am-9pm  Sat/Sun 9am-5pm.   Pharmacy   Mon-Th:  8:00am-8pm   Fri:  8:00am-6:00pm  Sat/Sun  8:00am-5:00 pm       Stages of Periodontal Disease  Periodontal disease is an infection of the gums and tissues supporting the teeth. If not treated, it often gets worse. Bone damage and tooth loss can occur. Regular self-care and dental visits can help prevent or control periodontal disease.     Gngivitis       Periodontitis       Advanced periodontitis      Gingivitis  This is the mildest form of periodontal disease. The gum becomes irritated and swollen (inflamed). The space between the gum and tooth gets deeper, forming a pocket. Gums may become red and may bleed. Or, there may be no symptoms. Left untreated, it can progress to periodontitis.  Periodontitis  Infection and inflammation spread to the bone supporting the teeth. Gums may recede (shrink back) from the teeth. Pockets between the teeth can get deeper and harder to clean. Redness, swelling, and bleeding may develop or get worse. Infection begins to destroy the bone. As bone is destroyed, teeth may start to feel loose.  Advanced periodontitis  As periodontitis advances, pockets deepen even more and can fill with pus. Around the roots of the teeth, the gums may start to swell. Bone loss continues. The teeth may feel sensitive to heat or cold and may hurt when brushed. Teeth loosen more. In some cases, teeth may  "need to be removed to keep the disease from spreading.  Date Last Reviewed: 7/3/2015    2288-0521 [employer]. [address] [place] [address], [place] [address]. All rights reserved. This information is not intended as a substitute for professional medical care. Always follow your healthcare professional's instructions.                Follow-ups after your visit        Follow-up notes from your care team     Return if symptoms worsen or fail to improve.      Who to contact     If you have questions or need follow up information about today's clinic visit or your schedule please contact [facility] directly at [phone].  Normal or non-critical lab and imaging results will be communicated to you by MyChart, letter or phone within 4 business days after the clinic has received the results. If you do not hear from us within 7 days, please contact the clinic through [employer] or phone. If you have a critical or abnormal lab result, we will notify you by phone as soon as possible.  Submit refill requests through [employer] or call your pharmacy and they will forward the refill request to us. Please allow 3 business days for your refill to be completed.          Additional Information About Your Visit        MyChart Information     [employer] lets you send messages to your doctor, view your test results, renew your prescriptions, schedule appointments and more. To sign up, go to [email or web address] . Click on \"Log in\" on the left side of the screen, which will take you to the Welcome page. Then click on \"Sign up Now\" on the right side of the page.     You will be asked to enter the access code listed below, as well as some personal information. Please follow the directions to create your username and password.     Your access code is: [email or web address]  Expires: 2017 11:15 AM     Your access code will  in 90 days. If you need help or a new code, please call your [facility] or " "[phone].        Care EveryWhere ID     This is your Care EveryWhere ID. This could be used by other organizations to access your [place] medical records  [id number]        Your Vitals Were     Pulse Temperature Respirations Height Last Period Pulse Oximetry    87 98  F (36.7  C) (Oral) 17 5' 3.5\" (1.613 m) 01/03/2017 (Exact Date) 99%    Breastfeeding? BMI (Body Mass Index)                No 27.2 kg/m2           Blood Pressure from Last 3 Encounters:   06/20/17 112/69   02/14/17 131/78   12/12/16 124/67    Weight from Last 3 Encounters:   06/20/17 156 lb (70.8 kg)   02/14/17 145 lb (65.8 kg)   12/12/16 147 lb (66.7 kg)              Today, you had the following     No orders found for display       Primary Care Provider Office Phone # Fax #    Jolie Loaiza PA-C [phone]       [facility] [address]  [facility] [address]        Thank you!     Thank you for choosing [facility]  for your care. Our goal is always to provide you with excellent care. Hearing back from our patients is one way we can continue to improve our services. Please take a few minutes to complete the written survey that you may receive in the mail after your visit with us. Thank you!             Your Updated Medication List - Protect others around you: Learn how to safely use, store and throw away your medicines at www.disposemymeds.org.          This list is accurate as of: 6/20/17 11:15 AM.  Always use your most recent med list.                   Brand Name Dispense Instructions for use    prenatal multivitamin  with iron 28-0.8 MG Tabs     90 each    Take 1 tablet by mouth daily         "

## 2017-08-21 LAB — PAP-ABSTRACT: NORMAL

## 2017-09-25 ENCOUNTER — TRANSFERRED RECORDS (OUTPATIENT)
Dept: HEALTH INFORMATION MANAGEMENT | Facility: CLINIC | Age: 22
End: 2017-09-25

## 2017-09-25 LAB
C TRACH DNA SPEC QL PROBE+SIG AMP: NEGATIVE
N GONORRHOEA DNA SPEC QL PROBE+SIG AMP: NEGATIVE
SPECIMEN DESCRIP: NORMAL
SPECIMEN DESCRIPTION: NORMAL

## 2017-10-08 ENCOUNTER — HEALTH MAINTENANCE LETTER (OUTPATIENT)
Age: 22
End: 2017-10-08

## 2018-08-16 ENCOUNTER — OFFICE VISIT (OUTPATIENT)
Dept: FAMILY MEDICINE | Facility: CLINIC | Age: 23
End: 2018-08-16
Payer: COMMERCIAL

## 2018-08-16 VITALS
HEART RATE: 97 BPM | WEIGHT: 182.8 LBS | DIASTOLIC BLOOD PRESSURE: 78 MMHG | SYSTOLIC BLOOD PRESSURE: 119 MMHG | OXYGEN SATURATION: 100 % | TEMPERATURE: 98.9 F | BODY MASS INDEX: 32.39 KG/M2 | RESPIRATION RATE: 16 BRPM | HEIGHT: 63 IN

## 2018-08-16 DIAGNOSIS — Z23 NEED FOR PROPHYLACTIC VACCINATION WITH TETANUS-DIPHTHERIA (TD): ICD-10-CM

## 2018-08-16 DIAGNOSIS — S39.012A STRAIN OF LUMBAR REGION, INITIAL ENCOUNTER: Primary | ICD-10-CM

## 2018-08-16 DIAGNOSIS — Z23 NEED FOR HPV VACCINE: ICD-10-CM

## 2018-08-16 PROCEDURE — 99214 OFFICE O/P EST MOD 30 MIN: CPT | Performed by: PHYSICIAN ASSISTANT

## 2018-08-16 RX ORDER — DICLOFENAC SODIUM 75 MG/1
75 TABLET, DELAYED RELEASE ORAL 2 TIMES DAILY PRN
Qty: 30 TABLET | Refills: 1 | Status: SHIPPED | OUTPATIENT
Start: 2018-08-16 | End: 2018-10-08

## 2018-08-16 RX ORDER — METHOCARBAMOL 750 MG/1
750 TABLET, FILM COATED ORAL 3 TIMES DAILY
Qty: 30 TABLET | Refills: 1 | Status: SHIPPED | OUTPATIENT
Start: 2018-08-16 | End: 2018-10-08

## 2018-08-16 ASSESSMENT — PAIN SCALES - GENERAL: PAINLEVEL: NO PAIN (0)

## 2018-08-16 NOTE — PATIENT INSTRUCTIONS
Robaxin 750 mg 1 tablet at bedtime or up to three times a day as needed   Diclofenac 75 mg 1 tablet twice a day with food for pain as needed   Apply heating pad  Do daily stretches  Start therapy  Exercises to Strengthen Your Lower Back  Strong lower back and abdominal muscles work together to support your spine. The exercises below will help strengthen the lower back. It is important that you begin exercising slowly and increase levels gradually.  Always begin any exercise program with stretching. If you feel pain while doing any of these exercises, stop and talk to your doctor about a more specific exercise program that better suits your condition.   Low back stretch  The point of stretching is to make you more flexible and increase your range of motion. Stretch only as much as you are able. Stretch slowly. Do not push your stretch to the limit. If at any point you feel pain while stretching, this is your (temporary) limit.    Lie on your back with your knees bent and both feet on the ground.    Slowly raise your left knee to your chest as you flatten your lower back against the floor. Hold for 5 seconds.    Relax and repeat the exercise with your right knee.    Do 10 of these exercises for each leg.    Repeat hugging both knees to your chest at the same time.  Building lower back strength  Start your exercise routine with 10 to 30 minutes a day, 1 to 3 times a day.  Initial exercises  Lying on your back:  1. Ankle pumps: Move your foot up and down, towards your head, and then away. Repeat 10 times with each foot.  2. Heel slides: Slowly bend your knee, drawing the heel of your foot towards you. Then slide your heel/foot from you, straightening your knee. Do not lift your foot off the floor (this is not a leg lift).  3. Abdominal contraction: Bend your knees and put your hands on your stomach. Tighten your stomach muscles. Hold for 5 seconds, then relax. Repeat 10 times.  4. Straight leg raise: Bend one leg at the  knee and keep the other leg straight. Tighten your stomach muscles. Slowly lift your straight leg 6 to 12 inches off the floor and hold for up to 5 seconds. Repeat 10 times on each side.  Standin. Wall squats: Stand with your back against the wall. Move your feet about 12 inches away from the wall. Tighten your stomach muscles, and slowly bend your knees until they are at about a 45 degree angle. Do not go down too far. Hold about 5 seconds. Then slowly return to your starting position. Repeat 10 times.  2. Heel raises: Stand facing the wall. Slowly raise the heels of your feet up and down, while keeping your toes on the floor. If you have trouble balancing, you can touch the wall with your hands. Repeat 10 times.  More advanced exercises  When you feel comfortable enough, try these exercises.  1. Kneeling lumbar extension: Begin on your hands and knees. At the same time, raise and straighten your right arm and left leg until they are parallel to the ground. Hold for 2 seconds and come back slowly to a starting position. Repeat with left arm and right leg, alternating 10 times.  2. Prone lumbar extension: Lie face down, arms extended overhead, palms on the floor. At the same time, raise your right arm and left leg as high as comfortably possible. Hold for 10 seconds and slowly return to start. Repeat with left arm and right leg, alternating 10 times. Gradually build up to 20 times. (Advanced: Repeat this exercise raising both arms and both legs a few inches off the floor at the same time. Hold for 5 seconds and release.)  3. Pelvic tilt: Lie on the floor on your back with your knees bent at 90 degrees. Your feet should be flat on the floor. Inhale, exhale, then slowly contract your abdominal muscles bringing your navel toward your spine. Let your pelvis rock back until your lower back is flat on the floor. Hold for 10 seconds while breathing smoothly.  4. Abdominal crunch: Perform a pelvic tilt (above)  flattening your lower back against the floor. Holding the tension in your abdominal muscles, take another breath and raise your shoulder blades off the ground (this is not a full sit-up). Keep your head in line with your body (don t bend your neck forward). Hold for 2 seconds, then slowly lower.  Date Last Reviewed: 6/1/2016 2000-2017 [employer]. [address] [place] [address]. All rights reserved. This information is not intended as a substitute for professional medical care. Always follow your healthcare professional's instructions.

## 2018-08-16 NOTE — Clinical Note
Please abstract the following data from this visit with this patient into the appropriate field in Epic:  Pap smear done on this date: 8/21/17 (approximately), by this group: [facility], results were NIl.  Chlamydia testing done on this date: 8/21/17

## 2018-08-16 NOTE — MR AVS SNAPSHOT
After Visit Summary   8/16/2018    Birdie Ellis    MRN: [id number]           Patient Information     Date Of Birth          1995        Visit Information        Provider Department      8/16/2018 11:20 AM Amy Ayala PA-C [facility]        Today's Diagnoses     Strain of lumbar region, initial encounter    -  1    Need for HPV vaccine        Need for prophylactic vaccination with tetanus-diphtheria (TD)          Care Instructions    Robaxin 750 mg 1 tablet at bedtime or up to three times a day as needed   Diclofenac 75 mg 1 tablet twice a day with food for pain as needed   Apply heating pad  Do daily stretches  Start therapy  Exercises to Strengthen Your Lower Back  Strong lower back and abdominal muscles work together to support your spine. The exercises below will help strengthen the lower back. It is important that you begin exercising slowly and increase levels gradually.  Always begin any exercise program with stretching. If you feel pain while doing any of these exercises, stop and talk to your doctor about a more specific exercise program that better suits your condition.   Low back stretch  The point of stretching is to make you more flexible and increase your range of motion. Stretch only as much as you are able. Stretch slowly. Do not push your stretch to the limit. If at any point you feel pain while stretching, this is your (temporary) limit.    Lie on your back with your knees bent and both feet on the ground.    Slowly raise your left knee to your chest as you flatten your lower back against the floor. Hold for 5 seconds.    Relax and repeat the exercise with your right knee.    Do 10 of these exercises for each leg.    Repeat hugging both knees to your chest at the same time.  Building lower back strength  Start your exercise routine with 10 to 30 minutes a day, 1 to 3 times a day.  Initial exercises  Lying on your back:  1. Ankle  pumps: Move your foot up and down, towards your head, and then away. Repeat 10 times with each foot.  2. Heel slides: Slowly bend your knee, drawing the heel of your foot towards you. Then slide your heel/foot from you, straightening your knee. Do not lift your foot off the floor (this is not a leg lift).  3. Abdominal contraction: Bend your knees and put your hands on your stomach. Tighten your stomach muscles. Hold for 5 seconds, then relax. Repeat 10 times.  4. Straight leg raise: Bend one leg at the knee and keep the other leg straight. Tighten your stomach muscles. Slowly lift your straight leg 6 to 12 inches off the floor and hold for up to 5 seconds. Repeat 10 times on each side.  Standin. Wall squats: Stand with your back against the wall. Move your feet about 12 inches away from the wall. Tighten your stomach muscles, and slowly bend your knees until they are at about a 45 degree angle. Do not go down too far. Hold about 5 seconds. Then slowly return to your starting position. Repeat 10 times.  2. Heel raises: Stand facing the wall. Slowly raise the heels of your feet up and down, while keeping your toes on the floor. If you have trouble balancing, you can touch the wall with your hands. Repeat 10 times.  More advanced exercises  When you feel comfortable enough, try these exercises.  1. Kneeling lumbar extension: Begin on your hands and knees. At the same time, raise and straighten your right arm and left leg until they are parallel to the ground. Hold for 2 seconds and come back slowly to a starting position. Repeat with left arm and right leg, alternating 10 times.  2. Prone lumbar extension: Lie face down, arms extended overhead, palms on the floor. At the same time, raise your right arm and left leg as high as comfortably possible. Hold for 10 seconds and slowly return to start. Repeat with left arm and right leg, alternating 10 times. Gradually build up to 20 times. (Advanced: Repeat this  exercise raising both arms and both legs a few inches off the floor at the same time. Hold for 5 seconds and release.)  3. Pelvic tilt: Lie on the floor on your back with your knees bent at 90 degrees. Your feet should be flat on the floor. Inhale, exhale, then slowly contract your abdominal muscles bringing your navel toward your spine. Let your pelvis rock back until your lower back is flat on the floor. Hold for 10 seconds while breathing smoothly.  4. Abdominal crunch: Perform a pelvic tilt (above) flattening your lower back against the floor. Holding the tension in your abdominal muscles, take another breath and raise your shoulder blades off the ground (this is not a full sit-up). Keep your head in line with your body (don t bend your neck forward). Hold for 2 seconds, then slowly lower.  Date Last Reviewed: 6/1/2016 2000-2017 [employer]. [address] [place] [address]. All rights reserved. This information is not intended as a substitute for professional medical care. Always follow your healthcare professional's instructions.                Follow-ups after your visit        Additional Services     [facility] PT, HAND, AND CHIROPRACTIC REFERRAL       **This order will print in the [facility] Scheduling Office**    Physical Therapy, Hand Therapy and Chiropractic Care are available through:    *[facility]  *[facility]  *[facility]    Call one number to schedule at any of the above locations: [phone].    Your provider has referred you to: Physical Therapy at [facility] or [facility]    Indication/Reason for Referral: Low Back Pain  Onset of Illness: 4 weeks ago  Therapy Orders: Evaluate and Treat  Special Programs: None  Special Request: None    Rashid Mike      Additional Comments for the Therapist or Chiropractor: none    Please be aware that coverage of these services is subject to the terms and limitations of your health insurance plan.   "Call member services at your health plan with any benefit or coverage questions.      Please bring the following to your appointment:    *Your personal calendar for scheduling future appointments  *Comfortable clothing                  Who to contact     If you have questions or need follow up information about today's clinic visit or your schedule please contact [facility] directly at [phone].  Normal or non-critical lab and imaging results will be communicated to you by MyChart, letter or phone within 4 business days after the clinic has received the results. If you do not hear from us within 7 days, please contact the clinic through MyChart or phone. If you have a critical or abnormal lab result, we will notify you by phone as soon as possible.  Submit refill requests through [employer] or call your pharmacy and they will forward the refill request to us. Please allow 3 business days for your refill to be completed.          Additional Information About Your Visit        [employer] Information     [employer] lets you send messages to your doctor, view your test results, renew your prescriptions, schedule appointments and more. To sign up, go to [email or web address] . Click on \"Log in\" on the left side of the screen, which will take you to the Welcome page. Then click on \"Sign up Now\" on the right side of the page.     You will be asked to enter the access code listed below, as well as some personal information. Please follow the directions to create your username and password.     Your access code is: [email or web address]  Expires: 2018 12:09 PM     Your access code will  in 90 days. If you need help or a new code, please call your [facility] or [phone].        Care EveryWhere ID     This is your Care EveryWhere ID. This could be used by other organizations to access your [place] medical records  [id number]        Your Vitals Were     Pulse Temperature Respirations Height Last " "Period Pulse Oximetry    97 98.9  F (37.2  C) (Oral) 16 1.607 m (5' 3.25\") 07/20/2018 (Within Days) 100%    Breastfeeding? BMI (Body Mass Index)                Yes 32.13 kg/m2           Blood Pressure from Last 3 Encounters:   08/16/18 119/78   06/20/17 112/69   02/14/17 131/78    Weight from Last 3 Encounters:   08/16/18 82.9 kg (182 lb 12.8 oz)   06/20/17 70.8 kg (156 lb)   02/14/17 65.8 kg (145 lb)              We Performed the Following     [facility] PT, HAND, AND CHIROPRACTIC REFERRAL          Today's Medication Changes          These changes are accurate as of 8/16/18 12:09 PM.  If you have any questions, ask your nurse or doctor.               Start taking these medicines.        Dose/Directions    diclofenac 75 MG EC tablet   Commonly known as:  VOLTAREN   Used for:  Strain of lumbar region, initial encounter   Started by:  Amy Ayala PA-C        Dose:  75 mg   Take 1 tablet (75 mg) by mouth 2 times daily as needed for moderate pain   Quantity:  30 tablet   Refills:  1       methocarbamol 750 MG tablet   Commonly known as:  ROBAXIN   Used for:  Strain of lumbar region, initial encounter   Started by:  Amy Ayala PA-C        Dose:  750 mg   Take 1 tablet (750 mg) by mouth 3 times daily   Quantity:  30 tablet   Refills:  1            Where to get your medicines      These medications were sent to [facility] [address] [place] [address]  [address] [facility] [address]    Hours:  24-hours Phone:  [phone]     diclofenac 75 MG EC tablet    methocarbamol 750 MG tablet                Primary Care Provider Office Phone # Fax #    Jolie Loaiza PA-C [phone] [address] [place] [address]        Equal Access to Services     [facility] KYLE AH: Hadii yany Beck, wajoleneda luqadaha, qaybta kasreedhar mercedes. So [facility] " [phone].    ATENCIÓN: Si omar acosta, tiene a oh disposición servicios gratuitos de asistencia lingüística. Breana hadley [phone].    We comply with applicable federal civil rights laws and [place] laws. We do not discriminate on the basis of race, color, national origin, age, disability, sex, sexual orientation, or gender identity.            Thank you!     Thank you for choosing [facility]  for your care. Our goal is always to provide you with excellent care. Hearing back from our patients is one way we can continue to improve our services. Please take a few minutes to complete the written survey that you may receive in the mail after your visit with us. Thank you!             Your Updated Medication List - Protect others around you: Learn how to safely use, store and throw away your medicines at www.disposemymeds.org.          This list is accurate as of 8/16/18 12:09 PM.  Always use your most recent med list.                   Brand Name Dispense Instructions for use Diagnosis    diclofenac 75 MG EC tablet    VOLTAREN    30 tablet    Take 1 tablet (75 mg) by mouth 2 times daily as needed for moderate pain    Strain of lumbar region, initial encounter       levonorgestrel 20 MCG/24HR IUD    MIRENA     1 each by Intrauterine route        methocarbamol 750 MG tablet    ROBAXIN    30 tablet    Take 1 tablet (750 mg) by mouth 3 times daily    Strain of lumbar region, initial encounter

## 2018-08-16 NOTE — PROGRESS NOTES
SUBJECTIVE:   Birdie Ellis is a 22 year old female who presents to clinic today for the following health issues:    Back Pain       Duration: 4 weeks, but worse in last 2 weeks        Specific cause: none    Description:   Location of pain: low back both  Character of pain: stabbing and pinching, and caramping  Pain radiation:none  New numbness or weakness in legs, not attributed to pain:  no     Intensity: Currently 0/10    History:   Pain interferes with job: Not applicable  History of back problems: no prior back problems  Any previous MRI or X-rays: doesn't remember   Sees a specialist for back pain:  No  Therapies tried without relief: massage and Physical Therapy    Alleviating factors:   Improved by: nothing       Precipitating factors:  Worsened by: Lifting, Bending, Standing, Lying Flat and Walking    Functional and Psychosocial Screen (Rashid STarT Back):      Not performed today        Accompanying Signs & Symptoms:  Risk of Fracture:  None  Risk of Cauda Equina:  None  Risk of Infection:  None  Risk of Cancer:  None  Risk of Ankylosing Spondylitis:  Onset at age <35, male, AND morning back stiffness. no     Problem list and histories reviewed & adjusted, as indicated.  Additional history: as documented    Patient Active Problem List   Diagnosis     CARDIOVASCULAR SCREENING; LDL GOAL LESS THAN 160     Learning disability     Cold sore     Past Surgical History:   Procedure Laterality Date     NO HISTORY OF SURGERY         Social History   Substance Use Topics     Smoking status: Never Smoker     Smokeless tobacco: Never Used     Alcohol use No     Family History   Problem Relation Age of Onset     Diabetes Mother      Depression Mother      Asthma Mother      Diabetes Father      Breast Cancer Maternal Aunt      in late 30's early 40's     Breast Cancer Other 30     maternal cousin     Hypertension No family hx of      Cerebrovascular Disease No family hx of      Thyroid Disease No family hx of   "    Glaucoma No family hx of      Macular Degeneration No family hx of      Anxiety Disorder No family hx of      Bleeding Disorder No family hx of      Liver Disease No family hx of          Current Outpatient Prescriptions   Medication Sig Dispense Refill     diclofenac (VOLTAREN) 75 MG EC tablet Take 1 tablet (75 mg) by mouth 2 times daily as needed for moderate pain 30 tablet 1     levonorgestrel (MIRENA) 20 MCG/24HR IUD 1 each by Intrauterine route       methocarbamol (ROBAXIN) 750 MG tablet Take 1 tablet (750 mg) by mouth 3 times daily 30 tablet 1     Allergies   Allergen Reactions     Pollen Extract        Reviewed and updated as needed this visit by clinical staff  Tobacco  Allergies  Meds  Problems  Med Hx  Surg Hx  Fam Hx  Soc Hx        Reviewed and updated as needed this visit by Provider  Allergies  Meds  Problems         ROS:  Constitutional, HEENT, cardiovascular, pulmonary, GI, , musculoskeletal, neuro, skin, endocrine and psych systems are negative, except as otherwise noted.    OBJECTIVE:     /78 (BP Location: Right arm, Patient Position: Sitting, Cuff Size: Adult Regular)  Pulse 97  Temp 98.9  F (37.2  C) (Oral)  Resp 16  Ht 1.607 m (5' 3.25\")  Wt 82.9 kg (182 lb 12.8 oz)  LMP 07/20/2018 (Within Days)  SpO2 100%  Breastfeeding? No  BMI 32.13 kg/m2  Body mass index is 32.13 kg/(m^2).  GENERAL: healthy, alert and no distress  Comprehensive back pain exam:  No tenderness, Pain limits the following motions: bending forward at waist, Lower extremity strength functional and equal on both sides, Lower extremity reflexes within normal limits bilaterally, Lower extremity sensation normal and equal on both sides and Straight leg raise negative bilaterally    Diagnostic Test Results:  none     ASSESSMENT/PLAN:       ICD-10-CM    1. Strain of lumbar region, initial encounter S39.012A diclofenac (VOLTAREN) 75 MG EC tablet     methocarbamol (ROBAXIN) 750 MG tablet     [facility] PT, HAND, AND " CHIROPRACTIC REFERRAL   2. Need for HPV vaccine Z23    3. Need for prophylactic vaccination with tetanus-diphtheria (TD) Z23      Robaxin 750 mg 1 tablet at bedtime or up to three times a day as needed   Diclofenac 75 mg 1 tablet twice a day with food for pain as needed   Apply heating pad  Do daily stretches  Start therapy  Follow up in 3-4 weeks if not better after therapy.       Amy Ayala PA-C  [facility]

## 2018-08-21 ENCOUNTER — THERAPY VISIT (OUTPATIENT)
Dept: PHYSICAL THERAPY | Facility: CLINIC | Age: 23
End: 2018-08-21
Payer: COMMERCIAL

## 2018-08-21 DIAGNOSIS — M54.50 ACUTE MIDLINE LOW BACK PAIN WITHOUT SCIATICA: Primary | ICD-10-CM

## 2018-08-21 PROCEDURE — 97140 MANUAL THERAPY 1/> REGIONS: CPT | Mod: GP | Performed by: PHYSICAL THERAPIST

## 2018-08-21 PROCEDURE — 97110 THERAPEUTIC EXERCISES: CPT | Mod: GP | Performed by: PHYSICAL THERAPIST

## 2018-08-21 PROCEDURE — 97161 PT EVAL LOW COMPLEX 20 MIN: CPT | Mod: GP | Performed by: PHYSICAL THERAPIST

## 2018-08-21 NOTE — PROGRESS NOTES
[facility] Initial Evaluation      Subjective:  Patient is a 22 year old female presenting with rehab back hpi. The history is provided by the patient.   Birdie Ellis is a 22 year old female with a lumbar condition.  Condition occurred with:  Insidious onset.  Condition occurred: for unknown reasons.  This is a new condition  Pt notes that her back pain started about 3 months ago, but worsened in the last 2-3 wks.  She has consulted w/a PCP for this on 8/16/18.  She did have an IUD placed 1 month previously, but was already having her back pain at that time..    Patient reports pain:  Central lumbar spine and lower lumbar spine.  Radiates to:  No radiation.  Pain is described as aching and sharp (pinching) and is intermittent (w/pain med) and reported as 3/10 (6/10 at worst).  Associated symptoms:  Loss of motion/stiffness and loss of strength. Pain is worse in the A.M..  Symptoms are exacerbated by bending, lying down, carrying, lifting and sitting and relieved by NSAID's and activity/movement.  Since onset symptoms are gradually worsening.        General health as reported by patient is excellent.  Pertinent medical history includes:  None.  Medical allergies: no.  Other surgeries include:  No.  Current medications:  Anti-inflammatory.  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    Primary job tasks include:  Driving, lifting, repetitive tasks and prolonged standing (housecleaning, cooking, laundry, grocery shopping,  (4 mo old)).    Barriers include:  None as reported by the patient.    Red flags:  None as reported by the patient.                        Objective:    Gait:    Gait Type:  Normal   Assistive Devices:  None                 Lumbar/SI Evaluation  ROM:    AROM Lumbar:   Flexion:          75% of NL w/pain  Ext:                    75%, more pressure felt   Side Bend:        Left:  WNL, no pain    Right:  WNL, no pain  Rotation:           Left:     Right:   Side Glide:         Left:     Right:         Strength: weak TrA w/isolated contractions; DR of 1 1/2 inch long and 2 fingers wide          Neural Tension/Mobility:  Lumbar:  Normal (tight HS's R>L)        Lumbar Palpation:    Tenderness present at Left:    Erector Spinae; Gluteus Medius and Vertebral  Tenderness not present at Left:    Piriformis; PSIS or Iliac Crest  Tenderness present at Right: Erector Spinae and Vertebral  Tenderness not present at Right:  Piriformis; PSIS; Iliac Crest or Gluteus Medius      Spinal Segmental Conclusions: Pain and restrictions at L5/S1 > L4/5 levels w/compression testing; no pain and NL vertebral segmental mobility at remaining levels.          SI joint/Sacrum:    Negative findings with testing of SIJ in standing, supine, and prone positions.                                                         João Lumbar Evaluation    Posture:  Sitting: fair  Standing: fair  Lordosis: Accentuated  Lateral Shift: no  Correction of Posture: better                                                   ROS    Assessment/Plan:    Patient is a 22 year old female with lumbar complaints.    Patient has the following significant findings with corresponding treatment plan.                Diagnosis 1:  Lumbar derangement w/o radiculopathy  Pain -  hot/cold therapy, self management, education, directional preference exercise and home program  Decreased ROM/flexibility - manual therapy and therapeutic exercise  Decreased joint mobility - manual therapy and therapeutic exercise  Decreased strength - therapeutic exercise and therapeutic activities  Impaired muscle performance - neuro re-education  Decreased function - therapeutic activities  Impaired posture - neuro re-education and therapeutic activities    Therapy Evaluation Codes:   1) History comprised of:   Personal factors that impact the plan of care:      Past/current experiences and Time since onset of symptoms.    Comorbidity factors that impact the plan of  care are:      Overweight.     Medications impacting care: Anti-inflammatory.  2) Examination of Body Systems comprised of:   Body structures and functions that impact the plan of care:      Lumbar spine.   Activity limitations that impact the plan of care are:      Bending, Lifting, Sitting and Sleeping.  3) Clinical presentation characteristics are:   Stable/Uncomplicated.  4) Decision-Making    Low complexity using standardized patient assessment instrument and/or measureable assessment of functional outcome.  Cumulative Therapy Evaluation is: Low complexity.    Previous and current functional limitations:  (See Goal Flow Sheet for this information)    Short term and Long term goals: (See Goal Flow Sheet for this information)     Communication ability:  Patient appears to be able to clearly communicate and understand verbal and written communication and follow directions correctly.  Treatment Explanation - The following has been discussed with the patient:   RX ordered/plan of care  Anticipated outcomes  Possible risks and side effects  This patient would benefit from PT intervention to resume normal activities.   Rehab potential is excellent.    Frequency:  2 X week, once daily  Duration:  for 1 weeks tapering to 1 X a week over 4 weeks  Discharge Plan:  Achieve all LTG.  Independent in home treatment program.  Reach maximal therapeutic benefit.    Please refer to the daily flowsheet for treatment today, total treatment time and time spent performing 1:1 timed codes.

## 2018-08-21 NOTE — MR AVS SNAPSHOT
After Visit Summary   8/21/2018    Birdie Ellis    MRN: [id number]           Patient Information     Date Of Birth          1995        Visit Information        Provider Department      8/21/2018 11:30 AM Gracy Payne PT [facility]        Today's Diagnoses     Acute midline low back pain without sciatica    -  1       Follow-ups after your visit        Your next 10 appointments already scheduled     Aug 24, 2018 11:30 AM CDT   [facility] Spine with Gracy Payne PT   [facility] ([facility]  )    [address] [facility] [address]   [phone]            Aug 31, 2018 11:30 AM CDT   Y[facility] Spine with Gracy Payne PT   [facility] ([facility]  )    [address] [facility] [address]   [phone]            Sep 07, 2018 10:50 AM CDT   Y[facility] Spine with Georgie Bray PTA   [facility] ([facility]  )    [address] [facility] [address]   [phone]            Sep 14, 2018 10:50 AM CDT   YOS[facility] Spine with Georgie Bray PTA   [facility] ([facility]  )    [address] [facility] [address]   [phone]            Sep 21, 2018 11:30 AM CDT   YOS[facility] Spine with Gracy Payne PT   [facility] ([facility]  )    [address] [facility] [address]   [phone]              Who to contact     If you have questions or need follow up information about today's clinic visit or your schedule please contact [facility] directly at [phone].  Normal or non-critical lab and imaging results will be communicated to you by MyChart, letter or phone within 4 business days after the clinic has received the results. If you do not hear from us within 7 days, please contact the  "clinic through Ivantishart or phone. If you have a critical or abnormal lab result, we will notify you by phone as soon as possible.  Submit refill requests through Seevibes or call your pharmacy and they will forward the refill request to us. Please allow 3 business days for your refill to be completed.          Additional Information About Your Visit        Ivantishart Information     Seevibes lets you send messages to your doctor, view your test results, renew your prescriptions, schedule appointments and more. To sign up, go to [email or web address] . Click on \"Log in\" on the left side of the screen, which will take you to the Welcome page. Then click on \"Sign up Now\" on the right side of the page.     You will be asked to enter the access code listed below, as well as some personal information. Please follow the directions to create your username and password.     Your access code is: [email or web address]  Expires: 2018 12:09 PM     Your access code will  in 90 days. If you need help or a new code, please call your [facility] clinic or [phone].        Care EveryWhere ID     This is your Care EveryWhere ID. This could be used by other organizations to access your [facility] medical records  [id number]         Blood Pressure from Last 3 Encounters:   18 119/78   17 112/69   17 131/78    Weight from Last 3 Encounters:   18 82.9 kg (182 lb 12.8 oz)   17 70.8 kg (156 lb)   17 65.8 kg (145 lb)              We Performed the Following     HC PT EVAL, LOW COMPLEXITY     [facility] INITIAL EVAL REPORT     MANUAL THER TECH,1+REGIONS,EA 15 MIN     THERAPEUTIC EXERCISES        Primary Care Provider Office Phone # Fax #    Jolie Loaiza PA-C [phone] [address] [place] [address]        Equal Access to Services     [facility] : Lana trinidad Soginette, waaxda luqadaha, qaybta kaalmalindsay christianson, sreedhar camacho . So [facility] " [phone].    ATENCIÓN: Si omar acosta, tiene a oh disposición servicios gratuitos de asistencia lingüística. Breana hadley [phone].    We comply with applicable federal civil rights laws and [place] laws. We do not discriminate on the basis of race, color, national origin, age, disability, sex, sexual orientation, or gender identity.            Thank you!     Thank you for choosing [facility]  for your care. Our goal is always to provide you with excellent care. Hearing back from our patients is one way we can continue to improve our services. Please take a few minutes to complete the written survey that you may receive in the mail after your visit with us. Thank you!             Your Updated Medication List - Protect others around you: Learn how to safely use, store and throw away your medicines at www.disposemymeds.org.          This list is accurate as of 8/21/18 12:43 PM.  Always use your most recent med list.                   Brand Name Dispense Instructions for use Diagnosis    diclofenac 75 MG EC tablet    VOLTAREN    30 tablet    Take 1 tablet (75 mg) by mouth 2 times daily as needed for moderate pain    Strain of lumbar region, initial encounter       levonorgestrel 20 MCG/24HR IUD    MIRENA     1 each by Intrauterine route        methocarbamol 750 MG tablet    ROBAXIN    30 tablet    Take 1 tablet (750 mg) by mouth 3 times daily    Strain of lumbar region, initial encounter

## 2018-08-31 ENCOUNTER — THERAPY VISIT (OUTPATIENT)
Dept: PHYSICAL THERAPY | Facility: CLINIC | Age: 23
End: 2018-08-31
Payer: COMMERCIAL

## 2018-08-31 DIAGNOSIS — M54.50 ACUTE MIDLINE LOW BACK PAIN WITHOUT SCIATICA: ICD-10-CM

## 2018-08-31 PROCEDURE — 97140 MANUAL THERAPY 1/> REGIONS: CPT | Mod: GP | Performed by: PHYSICAL THERAPIST

## 2018-08-31 PROCEDURE — 97110 THERAPEUTIC EXERCISES: CPT | Mod: GP | Performed by: PHYSICAL THERAPIST

## 2018-09-07 ENCOUNTER — THERAPY VISIT (OUTPATIENT)
Dept: PHYSICAL THERAPY | Facility: CLINIC | Age: 23
End: 2018-09-07
Payer: COMMERCIAL

## 2018-09-07 DIAGNOSIS — M54.50 ACUTE MIDLINE LOW BACK PAIN WITHOUT SCIATICA: ICD-10-CM

## 2018-09-07 PROCEDURE — 97140 MANUAL THERAPY 1/> REGIONS: CPT | Mod: GP

## 2018-09-07 PROCEDURE — 97530 THERAPEUTIC ACTIVITIES: CPT | Mod: GP

## 2018-09-07 PROCEDURE — 97110 THERAPEUTIC EXERCISES: CPT | Mod: GP

## 2018-09-14 ENCOUNTER — THERAPY VISIT (OUTPATIENT)
Dept: PHYSICAL THERAPY | Facility: CLINIC | Age: 23
End: 2018-09-14
Payer: COMMERCIAL

## 2018-09-14 DIAGNOSIS — M54.50 ACUTE MIDLINE LOW BACK PAIN WITHOUT SCIATICA: ICD-10-CM

## 2018-09-14 PROCEDURE — 97530 THERAPEUTIC ACTIVITIES: CPT | Mod: GP

## 2018-09-14 PROCEDURE — 97140 MANUAL THERAPY 1/> REGIONS: CPT | Mod: GP

## 2018-09-14 PROCEDURE — 97110 THERAPEUTIC EXERCISES: CPT | Mod: GP

## 2018-09-21 ENCOUNTER — THERAPY VISIT (OUTPATIENT)
Dept: PHYSICAL THERAPY | Facility: CLINIC | Age: 23
End: 2018-09-21
Payer: COMMERCIAL

## 2018-09-21 DIAGNOSIS — M54.50 ACUTE MIDLINE LOW BACK PAIN WITHOUT SCIATICA: ICD-10-CM

## 2018-09-21 PROCEDURE — 97112 NEUROMUSCULAR REEDUCATION: CPT | Mod: GP | Performed by: PHYSICAL THERAPIST

## 2018-09-21 PROCEDURE — 97530 THERAPEUTIC ACTIVITIES: CPT | Mod: GP | Performed by: PHYSICAL THERAPIST

## 2018-09-21 PROCEDURE — 97110 THERAPEUTIC EXERCISES: CPT | Mod: GP | Performed by: PHYSICAL THERAPIST

## 2018-09-21 NOTE — MR AVS SNAPSHOT
After Visit Summary   9/21/2018    Birdie Ellis    MRN: [id number]           Patient Information     Date Of Birth          1995        Visit Information        Provider Department      9/21/2018 11:30 AM Gracy Payne PT [facility]        Today's Diagnoses     Acute midline low back pain without sciatica           Follow-ups after your visit        Your next 10 appointments already scheduled     Oct 05, 2018  1:50 PM CDT   YOS[facility] Spine with Gracy Payne PT   [facility] ([facility] [place]  )    [address]  [place] [address] [phone]            Oct 12, 2018 11:00 AM CDT   New Visit with Ginny Valles OD   [facility]    [address] [facility] [address] [phone]              Who to contact     If you have questions or need follow up information about today's clinic visit or your schedule please contact [facility] directly at [phone].  Normal or non-critical lab and imaging results will be communicated to you by MyChart, letter or phone within 4 business days after the clinic has received the results. If you do not hear from us within 7 days, please contact the clinic through MyChart or phone. If you have a critical or abnormal lab result, we will notify you by phone as soon as possible.  Submit refill requests through zkipstert or call your pharmacy and they will forward the refill request to us. Please allow 3 business days for your refill to be completed.          Additional Information About Your Visit        Care EveryWhere ID     This is your Care EveryWhere ID. This could be used by other organizations to access your [facility] medical records  [id number]         Blood Pressure from Last 3 Encounters:   08/16/18 119/78   06/20/17 112/69   02/14/17 131/78    Weight from  Last 3 Encounters:   08/16/18 82.9 kg (182 lb 12.8 oz)   06/20/17 70.8 kg (156 lb)   02/14/17 65.8 kg (145 lb)              We Performed the Following     NEUROMUSCULAR RE-EDUCATION     THERAPEUTIC ACTIVITIES     THERAPEUTIC EXERCISES        Primary Care Provider Office Phone # Fax #    Jolie An Loaiza PA-C [phone] [address] [place] [address]        Equal Access to Services     [facility]: Hadii aad ku hadasho Soomaali, waaxda luqadaha, qaybta kaalmada adeegyada, waxay idiin hayaan adeeg kharash la'jeff . So [facility] [phone].    ATENCIÓN: Si habla español, tiene a oh disposición servicios gratuitos de asistencia lingüística. [facility] [phone].    We comply with applicable federal civil rights laws and [place] laws. We do not discriminate on the basis of race, color, national origin, age, disability, sex, sexual orientation, or gender identity.            Thank you!     Thank you for choosing [facility]  for your care. Our goal is always to provide you with excellent care. Hearing back from our patients is one way we can continue to improve our services. Please take a few minutes to complete the written survey that you may receive in the mail after your visit with us. Thank you!             Your Updated Medication List - Protect others around you: Learn how to safely use, store and throw away your medicines at www.disposemymeds.org.          This list is accurate as of 9/21/18 12:18 PM.  Always use your most recent med list.                   Brand Name Dispense Instructions for use Diagnosis    diclofenac 75 MG EC tablet    VOLTAREN    30 tablet    Take 1 tablet (75 mg) by mouth 2 times daily as needed for moderate pain    Strain of lumbar region, initial encounter       levonorgestrel 20 MCG/24HR IUD    MIRENA     1 each by Intrauterine route        methocarbamol 750 MG tablet    ROBAXIN    30 tablet    Take 1 tablet (750 mg) by mouth  3 times daily    Strain of lumbar region, initial encounter

## 2018-10-08 ENCOUNTER — OFFICE VISIT (OUTPATIENT)
Dept: FAMILY MEDICINE | Facility: CLINIC | Age: 23
End: 2018-10-08
Payer: COMMERCIAL

## 2018-10-08 ENCOUNTER — TELEPHONE (OUTPATIENT)
Dept: FAMILY MEDICINE | Facility: CLINIC | Age: 23
End: 2018-10-08

## 2018-10-08 VITALS
TEMPERATURE: 98.8 F | OXYGEN SATURATION: 98 % | DIASTOLIC BLOOD PRESSURE: 70 MMHG | RESPIRATION RATE: 16 BRPM | HEART RATE: 94 BPM | SYSTOLIC BLOOD PRESSURE: 108 MMHG | BODY MASS INDEX: 32.85 KG/M2 | HEIGHT: 63 IN | WEIGHT: 185.4 LBS

## 2018-10-08 DIAGNOSIS — L60.8 TOENAIL DEFORMITY: Primary | ICD-10-CM

## 2018-10-08 PROCEDURE — 99213 OFFICE O/P EST LOW 20 MIN: CPT | Performed by: FAMILY MEDICINE

## 2018-10-08 ASSESSMENT — PAIN SCALES - GENERAL: PAINLEVEL: NO PAIN (0)

## 2018-10-08 NOTE — PROGRESS NOTES
SUBJECTIVE:   Birdie Ellis is a 22 year old female who presents to clinic today for the following health issues:    Toenail      Duration: 5x years    Description (location/character/radiation): left toenail    Intensity:  0/10, no pain but itchy    Accompanying signs and symptoms: itchy    History (similar episodes/previous evaluation): None    Precipitating or alleviating factors: None    Therapies tried and outcome: Use father cream -Ciclopirox Olamine Cream 0.77%   Outcome: Had help a bit         Problem list and histories reviewed & adjusted, as indicated.  Additional history: as documented    Patient Active Problem List   Diagnosis     CARDIOVASCULAR SCREENING; LDL GOAL LESS THAN 160     Learning disability     Cold sore     Acute midline low back pain without sciatica     Toenail deformity     Past Surgical History:   Procedure Laterality Date     NO HISTORY OF SURGERY         Social History   Substance Use Topics     Smoking status: Never Smoker     Smokeless tobacco: Never Used     Alcohol use No     Family History   Problem Relation Age of Onset     Diabetes Mother      Depression Mother      Asthma Mother      Diabetes Father      Breast Cancer Maternal Aunt      in late 30's early 40's     Breast Cancer Other 30     maternal cousin     Hypertension No family hx of      Cerebrovascular Disease No family hx of      Thyroid Disease No family hx of      Glaucoma No family hx of      Macular Degeneration No family hx of      Anxiety Disorder No family hx of      Bleeding Disorder No family hx of      Liver Disease No family hx of            Reviewed and updated as needed this visit by clinical staff  Tobacco  Allergies  Meds  Problems       Reviewed and updated as needed this visit by Provider  Allergies  Meds  Problems         ROS:  Constitutional, HEENT, cardiovascular, pulmonary, gi and gu systems are negative, except as otherwise noted.    OBJECTIVE:     /70 (BP Location: Left arm,  "Patient Position: Chair, Cuff Size: Adult Regular)  Pulse 94  Temp 98.8  F (37.1  C) (Oral)  Resp 16  Ht 5' 3.25\" (1.607 m)  Wt 185 lb 6.4 oz (84.1 kg)  SpO2 98%  Breastfeeding? No  BMI 32.58 kg/m2  Body mass index is 32.58 kg/(m^2).  GENERAL: healthy, alert and no distress  MS: no gross musculoskeletal defects noted, no edema  SKIN: left great toe nail hyperpigmentation and deformity with thickening noted  PSYCH: mentation appears normal, affect normal/bright    Diagnostic Test Results:  none     ASSESSMENT/PLAN:     1. Toenail deformity  Suspect fungus - discussed oral verse topical treatment and patient requested topical.  Labs prior to oral treatment if not covered or expensive.  - Efinaconazole 10 % SOLN; Externally apply topically daily Apply daily for 7 days then remove and restart treatment.  Dispense: 8 mL; Refill: 3    The uses and side effects, including black box warnings as appropriate, were discussed in detail.  All patient questions were answered.  The patient was instructed to call immediately if any side effects developed.     FUTURE APPOINTMENTS:       - Follow-up visit in 3 months.    Denice Varner MD  [facility]    "

## 2018-10-08 NOTE — MR AVS SNAPSHOT
After Visit Summary   10/8/2018    Birdie Ellis    MRN: [id number]           Patient Information     Date Of Birth          1995        Visit Information        Provider Department      10/8/2018 1:40 PM Denice Darby MD [facility]        Today's Diagnoses     Toenail deformity    -  1      Care Instructions    At [facility], we strive to deliver an exceptional experience to you, every time we see you.  If you receive a survey in the mail, please send us back your thoughts. We really do value your feedback.    Your care team:                            Family Medicine Internal Medicine   MD Denzel Crump MD Shantel Branch-Fleming, MD Katya Georgiev PA-C Megan Hill, APRHERIBERTO Barkley MD Pediatrics   CASSIDY Cordova, MD Lynn Kulkarni APRN CNP   MD Simran Cherry MD Deborah Mielke, MD Kim Thein, APRN CNP      Clinic hours: Monday - Thursday 7 am-7 pm; Fridays 7 am-5 pm.   Urgent care: Monday - Friday 11 am-9 pm; Saturday and Sunday 9 am-5 pm.  Pharmacy : Monday -Thursday 8 am-8 pm; Friday 8 am-6 pm; Saturday and Sunday 9 am-5 pm.     Clinic: [phone]   Pharmacy: [phone]               Follow-ups after your visit        Follow-up notes from your care team     Return in about 3 months (around 1/8/2019) for Medication Check.      Your next 10 appointments already scheduled     Oct 12, 2018 11:00 AM CDT   New Visit with Ginny Valles OD   [facility] ([facility]    [address] [place] [address] [phone]              Who to contact     If you have questions or need follow up information about today's clinic visit or your schedule please contact [facility] directly at [phone].  Normal or non-critical lab and imaging results will be communicated  "to you by MyChart, letter or phone within 4 business days after the clinic has received the results. If you do not hear from us within 7 days, please contact the clinic through MyChart or phone. If you have a critical or abnormal lab result, we will notify you by phone as soon as possible.  Submit refill requests through Vortal or call your pharmacy and they will forward the refill request to us. Please allow 3 business days for your refill to be completed.          Additional Information About Your Visit        Care EveryWhere ID     This is your Care EveryWhere ID. This could be used by other organizations to access your [place] medical records  [id number]        Your Vitals Were     Pulse Temperature Respirations Height Pulse Oximetry Breastfeeding?    94 98.8  F (37.1  C) (Oral) 16 5' 3.25\" (1.607 m) 98% No    BMI (Body Mass Index)                   32.58 kg/m2            Blood Pressure from Last 3 Encounters:   10/08/18 108/70   08/16/18 119/78   06/20/17 112/69    Weight from Last 3 Encounters:   10/08/18 185 lb 6.4 oz (84.1 kg)   08/16/18 182 lb 12.8 oz (82.9 kg)   06/20/17 156 lb (70.8 kg)              Today, you had the following     No orders found for display         Today's Medication Changes          These changes are accurate as of 10/8/18  2:16 PM.  If you have any questions, ask your nurse or doctor.               Start taking these medicines.        Dose/Directions    Efinaconazole 10 % Soln   Used for:  Toenail deformity   Started by:  Denice Darby MD        Externally apply topically daily Apply daily for 7 days then remove and restart treatment.   Quantity:  8 mL   Refills:  3            Where to get your medicines      These medications were sent to [id number] - [place], [place] - [address] [facility] AT [facility] [address] [place]  [address] [facility], [facility] [address]    Hours:  24-hours Phone:  [phone]     Efinaconazole 10 % Soln             "    Primary Care Provider Office Phone # Fax #    Denice Georgina Varner [phone]       [address]  [facility] [address]        Equal Access to Services     RANDY WRIGHT : Hadii aad ku hadasho Soomaali, waaxda luqadaha, qaybta kaalmada adeegyada, waxay idiin hayarmanin adeariella carreon laIftikharjeff castaneda. So [facility] [phone].    ATENCIÓN: Si habla español, tiene a oh disposición servicios gratuitos de asistencia lingüística. Llame al [phone].    We comply with applicable federal civil rights laws and [place] laws. We do not discriminate on the basis of race, color, national origin, age, disability, sex, sexual orientation, or gender identity.            Thank you!     Thank you for choosing [facility]  for your care. Our goal is always to provide you with excellent care. Hearing back from our patients is one way we can continue to improve our services. Please take a few minutes to complete the written survey that you may receive in the mail after your visit with us. Thank you!             Your Updated Medication List - Protect others around you: Learn how to safely use, store and throw away your medicines at www.disposemymeds.org.          This list is accurate as of 10/8/18  2:16 PM.  Always use your most recent med list.                   Brand Name Dispense Instructions for use Diagnosis    Efinaconazole 10 % Soln     8 mL    Externally apply topically daily Apply daily for 7 days then remove and restart treatment.    Toenail deformity       levonorgestrel 20 MCG/24HR IUD    MIRENA     1 each by Intrauterine route

## 2018-10-09 ENCOUNTER — TELEPHONE (OUTPATIENT)
Dept: FAMILY MEDICINE | Facility: CLINIC | Age: 23
End: 2018-10-09

## 2018-10-09 DIAGNOSIS — L60.8 TOENAIL DEFORMITY: Primary | ICD-10-CM

## 2018-10-09 RX ORDER — TERBINAFINE HYDROCHLORIDE 250 MG/1
250 TABLET ORAL DAILY
Qty: 90 TABLET | Refills: 0 | Status: SHIPPED | OUTPATIENT
Start: 2018-10-09 | End: 2020-10-29

## 2018-10-09 RX ORDER — CICLOPIROX 7.7 MG/G
GEL TOPICAL DAILY
Qty: 45 G | Refills: 3 | Status: SHIPPED | OUTPATIENT
Start: 2018-10-09 | End: 2018-10-09 | Stop reason: ALTCHOICE

## 2018-10-09 NOTE — TELEPHONE ENCOUNTER
Explained that the topical Rx not covered by insurance and [facility] lab only [facility]. And to have labs shama 1st before starting medication. Scheduled a lab only appointment for 10/10/18.  Mitzi Cervantes MA/  For [employer] [facility]

## 2018-10-09 NOTE — TELEPHONE ENCOUNTER
As discussed at appt, since topical treatments are not covered.  Oral medication sent to pharmacy and blood test needed before she starts it to check liver function. Discuss potential medication side effects with pharmacist.      If oral medication not covered, patient should schedule for toenail sampling and then we can try to get medication covered again.    We discussed all this options at her visit.    Follow up with clinic in 3 months.    Denice Reaves M.D.

## 2018-10-09 NOTE — TELEPHONE ENCOUNTER
Plan does not cover ciclopirox 0.77 % GEL.  Please call [phone] to initiate Prior Auth or change med.    Insurance type and ID number: [id number]      Additional Information:     Lorraine Melgar

## 2018-10-09 NOTE — TELEPHONE ENCOUNTER
Plan does not cover Efinaconazole 10 % SOLN. Please call plan at [phone] to initiate prior authorization or call/fax pharmacy to change med.      Pt ID# [id number]    Liliya Lane  [facility]

## 2018-10-12 ENCOUNTER — OFFICE VISIT (OUTPATIENT)
Dept: OPTOMETRY | Facility: CLINIC | Age: 23
End: 2018-10-12
Payer: COMMERCIAL

## 2018-10-12 ENCOUNTER — APPOINTMENT (OUTPATIENT)
Dept: OPTOMETRY | Facility: CLINIC | Age: 23
End: 2018-10-12
Payer: COMMERCIAL

## 2018-10-12 DIAGNOSIS — L60.8 TOENAIL DEFORMITY: ICD-10-CM

## 2018-10-12 DIAGNOSIS — H52.203 MYOPIA OF BOTH EYES WITH ASTIGMATISM: Primary | ICD-10-CM

## 2018-10-12 DIAGNOSIS — H52.13 MYOPIA OF BOTH EYES WITH ASTIGMATISM: Primary | ICD-10-CM

## 2018-10-12 LAB
ALBUMIN SERPL-MCNC: 3.4 G/DL (ref 3.4–5)
ALP SERPL-CCNC: 130 U/L (ref 40–150)
ALT SERPL W P-5'-P-CCNC: 52 U/L (ref 0–50)
ANION GAP SERPL CALCULATED.3IONS-SCNC: 9 MMOL/L (ref 3–14)
AST SERPL W P-5'-P-CCNC: 31 U/L (ref 0–45)
BILIRUB SERPL-MCNC: 0.4 MG/DL (ref 0.2–1.3)
BUN SERPL-MCNC: 12 MG/DL (ref 7–30)
CALCIUM SERPL-MCNC: 8.6 MG/DL (ref 8.5–10.1)
CHLORIDE SERPL-SCNC: 109 MMOL/L (ref 94–109)
CO2 SERPL-SCNC: 22 MMOL/L (ref 20–32)
CREAT SERPL-MCNC: 0.66 MG/DL (ref 0.52–1.04)
GFR SERPL CREATININE-BSD FRML MDRD: >90 ML/MIN/1.7M2
GLUCOSE SERPL-MCNC: 97 MG/DL (ref 70–99)
POTASSIUM SERPL-SCNC: 4.1 MMOL/L (ref 3.4–5.3)
PROT SERPL-MCNC: 7.7 G/DL (ref 6.8–8.8)
SODIUM SERPL-SCNC: 140 MMOL/L (ref 133–144)

## 2018-10-12 PROCEDURE — 92014 COMPRE OPH EXAM EST PT 1/>: CPT | Performed by: OPTOMETRIST

## 2018-10-12 PROCEDURE — 80053 COMPREHEN METABOLIC PANEL: CPT | Performed by: FAMILY MEDICINE

## 2018-10-12 PROCEDURE — 92340 FIT SPECTACLES MONOFOCAL: CPT | Performed by: OPTOMETRIST

## 2018-10-12 PROCEDURE — 36415 COLL VENOUS BLD VENIPUNCTURE: CPT | Performed by: FAMILY MEDICINE

## 2018-10-12 PROCEDURE — 92015 DETERMINE REFRACTIVE STATE: CPT | Performed by: OPTOMETRIST

## 2018-10-12 ASSESSMENT — VISUAL ACUITY
OD_SC+: -1
CORRECTION_TYPE: GLASSES
METHOD: SNELLEN - LINEAR
OS_CC: 20/20
OS_CC: 20/25
OD_CC: 20/20
OS_SC: 20/200
OD_CC: 20/20
OD_SC: 20/70

## 2018-10-12 ASSESSMENT — REFRACTION_MANIFEST
OD_AXIS: 094
OS_AXIS: 088
OD_CYLINDER: +1.00
OD_CYLINDER: +1.00
METHOD_AUTOREFRACTION: 1
OS_AXIS: 085
OS_SPHERE: -3.50
OS_CYLINDER: +1.25
OD_SPHERE: -2.75
OS_CYLINDER: +1.75
OD_SPHERE: -3.00
OS_SPHERE: -3.75
OD_AXIS: 091

## 2018-10-12 ASSESSMENT — REFRACTION_WEARINGRX
OS_AXIS: 85
OD_CYLINDER: +0.50
OD_AXIS: 92
OS_CYLINDER: +0.75
OS_SPHERE: -3.25
SPECS_TYPE: SVL
OD_SPHERE: -3.00

## 2018-10-12 ASSESSMENT — TONOMETRY
IOP_METHOD: APPLANATION
OS_IOP_MMHG: 19
OD_IOP_MMHG: 17

## 2018-10-12 ASSESSMENT — CONF VISUAL FIELD
OD_NORMAL: 1
OS_NORMAL: 1
METHOD: COUNTING FINGERS

## 2018-10-12 ASSESSMENT — SLIT LAMP EXAM - LIDS
COMMENTS: NORMAL
COMMENTS: NORMAL

## 2018-10-12 ASSESSMENT — CUP TO DISC RATIO
OD_RATIO: 0.2
OS_RATIO: 0.2

## 2018-10-12 ASSESSMENT — EXTERNAL EXAM - RIGHT EYE: OD_EXAM: NORMAL

## 2018-10-12 ASSESSMENT — EXTERNAL EXAM - LEFT EYE: OS_EXAM: NORMAL

## 2018-10-12 NOTE — MR AVS SNAPSHOT
After Visit Summary   10/12/2018    Birdie Ellis    MRN: [id number]           Patient Information     Date Of Birth          1995        Visit Information        Provider Department      10/12/2018 11:00 AM Ginny Valles OD [facility]        Today's Diagnoses     Myopia of both eyes with astigmatism    -  1      Care Instructions    There was a slight change in the prescription for your glasses.    Your eyes may be blurry at near and sensitive to light for several hours from the dilating drops.    Yearly eye exams recommended.    Thank you!          Follow-ups after your visit        Who to contact     If you have questions or need follow up information about today's clinic visit or your schedule please contact [facility] directly at [phone].  Normal or non-critical lab and imaging results will be communicated to you by MyChart, letter or phone within 4 business days after the clinic has received the results. If you do not hear from us within 7 days, please contact the clinic through MyChart or phone. If you have a critical or abnormal lab result, we will notify you by phone as soon as possible.  Submit refill requests through BravoSolution or call your pharmacy and they will forward the refill request to us. Please allow 3 business days for your refill to be completed.          Additional Information About Your Visit        Care EveryWhere ID     This is your Care EveryWhere ID. This could be used by other organizations to access your [place] medical records  [id number]         Blood Pressure from Last 3 Encounters:   10/08/18 108/70   08/16/18 119/78   06/20/17 112/69    Weight from Last 3 Encounters:   10/08/18 84.1 kg (185 lb 6.4 oz)   08/16/18 82.9 kg (182 lb 12.8 oz)   06/20/17 70.8 kg (156 lb)              We Performed the Following     EYE EXAM (SIMPLE-NONBILLABLE)     REFRACTION        Primary Care Provider Office Phone # Fax #     Denice Varner [phone]       [address]  [facility] [address]        Equal Access to Services     RANDY WRIGHT : Hadii yany ku hadpearlo Sohenriqueali, waaxda luqadaha, qaybta kaalmada adeegyada, sreedhar carreon laIftikharjeff leonel. So [facility] [phone].    ATENCIÓN: Si habla español, tiene a oh disposición servicios gratuitos de asistencia lingüística. Llame al [phone].    We comply with applicable federal civil rights laws and [place] laws. We do not discriminate on the basis of race, color, national origin, age, disability, sex, sexual orientation, or gender identity.            Thank you!     Thank you for choosing [facility]  for your care. Our goal is always to provide you with excellent care. Hearing back from our patients is one way we can continue to improve our services. Please take a few minutes to complete the written survey that you may receive in the mail after your visit with us. Thank you!             Your Updated Medication List - Protect others around you: Learn how to safely use, store and throw away your medicines at www.disposemymeds.org.          This list is accurate as of 10/12/18 11:52 AM.  Always use your most recent med list.                   Brand Name Dispense Instructions for use Diagnosis    levonorgestrel 20 MCG/24HR IUD    MIRENA     1 each by Intrauterine route        terbinafine 250 MG tablet    lamISIL    90 tablet    Take 1 tablet (250 mg) by mouth daily    Toenail deformity

## 2018-10-12 NOTE — PATIENT INSTRUCTIONS
There was a slight change in the prescription for your glasses.    Your eyes may be blurry at near and sensitive to light for several hours from the dilating drops.    Yearly eye exams recommended.    Thank you!

## 2018-10-12 NOTE — LETTER
[address] [address]  [phone]        February 18, 2019    Birdie Ellis  [address]   [facility] [address]              Dear Birdie Ellis    This is to remind you that your Non-fasting lab is due.    You may call our office at [phone] to schedule an appointment.    Please disregard this notice if you have already had your labs drawn or made an appointment.        Sincerely,         Lab

## 2018-10-12 NOTE — LETTER
10/12/2018         RE: Birdie Ellis  [address]  [facility] [address]        Dear Colleague,    Thank you for referring your patient, Birdie Ellis, to the [facility]. Please see a copy of my visit note below.    Chief Complaint   Patient presents with     COMPREHENSIVE EYE EXAM      Pt has elected not to have contact lens fit - she will go somewhere else.     Last Eye Exam: 2016  Dilated Previously: Yes    What are you currently using to see?  glasses       Distance Vision Acuity: Satisfied with vision    Near Vision Acuity: Satisfied with vision while reading  with glasses    Eye Comfort: good  Do you use eye drops? 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       OBJECTIVE: See Ophthalmology exam    ASSESSMENT:    ICD-10-CM    1. Myopia of both eyes with astigmatism H52.13 EYE EXAM (SIMPLE-NONBILLABLE)    H52.203 REFRACTION      PLAN:     Patient Instructions   There was a slight change in the prescription for your glasses.    Your eyes may be blurry at near and sensitive to light for several hours from the dilating drops.    Yearly eye exams recommended.    Thank you!         Again, thank you for allowing me to participate in the care of your patient.        Sincerely,        Ginny Valles OD

## 2018-10-12 NOTE — PROGRESS NOTES
Chief Complaint   Patient presents with     COMPREHENSIVE EYE EXAM      Pt has elected not to have contact lens fit - she will go somewhere else.     Last Eye Exam: 2016  Dilated Previously: Yes    What are you currently using to see?  glasses       Distance Vision Acuity: Satisfied with vision    Near Vision Acuity: Satisfied with vision while reading  with glasses    Eye Comfort: good  Do you use eye drops? 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       OBJECTIVE: See Ophthalmology exam    ASSESSMENT:    ICD-10-CM    1. Myopia of both eyes with astigmatism H52.13 EYE EXAM (SIMPLE-NONBILLABLE)    H52.203 REFRACTION      PLAN:     Patient Instructions   There was a slight change in the prescription for your glasses.    Your eyes may be blurry at near and sensitive to light for several hours from the dilating drops.    Yearly eye exams recommended.    Thank you!

## 2018-10-15 NOTE — PROGRESS NOTES
Ms. Marguerite Ellis,    One of your liver enzymes is very slightly high.  We should recheck this in 1 month.  Schedule a lab appointment.    Please contact the clinic if you have additional questions.  Thank you.    Sincerely,    Denice Varner

## 2018-11-26 ENCOUNTER — OFFICE VISIT (OUTPATIENT)
Dept: FAMILY MEDICINE | Facility: CLINIC | Age: 23
End: 2018-11-26
Payer: COMMERCIAL

## 2018-11-26 VITALS
WEIGHT: 187 LBS | HEIGHT: 63 IN | OXYGEN SATURATION: 99 % | RESPIRATION RATE: 18 BRPM | TEMPERATURE: 97.8 F | HEART RATE: 18 BPM | DIASTOLIC BLOOD PRESSURE: 78 MMHG | SYSTOLIC BLOOD PRESSURE: 137 MMHG | BODY MASS INDEX: 33.13 KG/M2

## 2018-11-26 DIAGNOSIS — R74.01 ELEVATED ALT MEASUREMENT: ICD-10-CM

## 2018-11-26 DIAGNOSIS — R07.89 CHEST WALL PAIN: Primary | ICD-10-CM

## 2018-11-26 PROCEDURE — 99213 OFFICE O/P EST LOW 20 MIN: CPT | Performed by: FAMILY MEDICINE

## 2018-11-26 ASSESSMENT — PAIN SCALES - GENERAL: PAINLEVEL: NO PAIN (0)

## 2018-11-26 NOTE — MR AVS SNAPSHOT
After Visit Summary   11/26/2018    Birdie Ellis    MRN: [id number]           Patient Information     Date Of Birth          1995        Visit Information        Provider Department      11/26/2018 4:40 PM Denice Darby MD [facility]        Today's Diagnoses     Chest wall pain    -  1    Elevated ALT measurement          Care Instructions    At [facility], we strive to deliver an exceptional experience to you, every time we see you.  If you receive a survey in the mail, please send us back your thoughts. We really do value your feedback.    Based on your medical history, these are the current health maintenance/preventive care services that you are due for (some may have been done at this visit.)  Health Maintenance Due   Topic Date Due     HIV SCREEN (SYSTEM ASSIGNED)  12/11/2013     INFLUENZA VACCINE (1) 09/01/2018     CHLAMYDIA SCREENING  09/25/2018         Suggested websites for health information:  Www.Fitbit : Up to date and easily searchable information on multiple topics.  Www.medlineplus.gov : medication info, interactive tutorials, watch real surgeries online  Www.familydoctor.org : good info from the Academy of Family Physicians  Www.cdc.gov : public health info, travel advisories, epidemics (H1N1)  Www.aap.org : children's health info, normal development, vaccinations  Www.health.state.[place].us : [place] dept of health, public health issues in [place], N1N1    Your care team:                            Family Medicine Internal Medicine   MD Denzel Crump MD Shantel Branch-Fleming, MD Katya Georgiev PA-C Nam Ho, MD Pediatrics   CASSIDY Cordova, DEEPTI Alatorre APRN MD Simran Sprague MD Deborah Mielke, MD Kim Thein, APRN CNP      Clinic hours: Monday - Thursday 7 am-7 pm; Fridays 7 am-5 pm.   Urgent care: Monday - Friday 11 am-9 pm; Saturday and Sunday  "9 am-5 pm.  Pharmacy : Monday -Thursday 8 am-8 pm; Friday 8 am-6 pm; Saturday and Sunday 9 am-5 pm.     Clinic: [phone]   Pharmacy: [phone]            Follow-ups after your visit        Follow-up notes from your care team     Return in about 4 weeks (around 12/24/2018) for Lab Work.      Who to contact     If you have questions or need follow up information about today's clinic visit or your schedule please contact [facility] directly at [phone].  Normal or non-critical lab and imaging results will be communicated to you by MyChart, letter or phone within 4 business days after the clinic has received the results. If you do not hear from us within 7 days, please contact the clinic through MyChart or phone. If you have a critical or abnormal lab result, we will notify you by phone as soon as possible.  Submit refill requests through Projektino or call your pharmacy and they will forward the refill request to us. Please allow 3 business days for your refill to be completed.          Additional Information About Your Visit        Care EveryWhere ID     This is your Care EveryWhere ID. This could be used by other organizations to access your [place] medical records  [id number]        Your Vitals Were     Pulse Temperature Respirations Height Pulse Oximetry Breastfeeding?    18 97.8  F (36.6  C) (Oral) 18 5' 3.25\" (1.607 m) 99% No    BMI (Body Mass Index)                   32.86 kg/m2            Blood Pressure from Last 3 Encounters:   11/26/18 137/78   10/08/18 108/70   08/16/18 119/78    Weight from Last 3 Encounters:   11/26/18 187 lb (84.8 kg)   10/08/18 185 lb 6.4 oz (84.1 kg)   08/16/18 182 lb 12.8 oz (82.9 kg)              Today, you had the following     No orders found for display       Primary Care Provider Office Phone # Fax #    Denice Varner [phone]       [address]  [facility] [address]        Equal Access to Services  "    RANDY WRIGHT : Hadii aad iona amos Beck, waaxda luqadaha, qaybta kaalmada ademeng, waxavery tacos hayjeff villaseñorwadepayam camacho . So [facility] [phone].    ATENCIÓN: Si habla español, tiene a oh disposición servicios gratuitos de asistencia lingüística. Llame al [phone].    We comply with applicable federal civil rights laws and [place] laws. We do not discriminate on the basis of race, color, national origin, age, disability, sex, sexual orientation, or gender identity.            Thank you!     Thank you for choosing [facility]  for your care. Our goal is always to provide you with excellent care. Hearing back from our patients is one way we can continue to improve our services. Please take a few minutes to complete the written survey that you may receive in the mail after your visit with us. Thank you!             Your Updated Medication List - Protect others around you: Learn how to safely use, store and throw away your medicines at www.disposemymeds.org.          This list is accurate as of 11/26/18  4:53 PM.  Always use your most recent med list.                   Brand Name Dispense Instructions for use Diagnosis    levonorgestrel 20 MCG/24HR IUD    MIRENA     1 each by Intrauterine route        terbinafine 250 MG tablet    lamISIL    90 tablet    Take 1 tablet (250 mg) by mouth daily    Toenail deformity

## 2018-11-26 NOTE — PROGRESS NOTES
SUBJECTIVE:   Birdie Ellis is a 22 year old female who presents to clinic today for the following health issues:    Concern - Breast Pain  Onset: 3days    Description:   Patient complains of pain on left breast on left side by axillary. Patient was told by someone at the clinic that breast pain may be possibly related to IUD Mirena due to hormones.    Intensity: 0/10    Progression of Symptoms:  improving    Accompanying Signs & Symptoms:  none    Previous history of similar problem:   none    Precipitating factors:   Worsened by: none    Alleviating factors:  Improved by: none    Therapies Tried and outcome: none    Has Mirena IUD in placed for 4 months.    Problem list and histories reviewed & adjusted, as indicated.  Additional history: as documented    Patient Active Problem List   Diagnosis     CARDIOVASCULAR SCREENING; LDL GOAL LESS THAN 160     Learning disability     Cold sore     Acute midline low back pain without sciatica     Toenail deformity     Past Surgical History:   Procedure Laterality Date     NO HISTORY OF SURGERY         Social History   Substance Use Topics     Smoking status: Never Smoker     Smokeless tobacco: Never Used     Alcohol use No     Family History   Problem Relation Age of Onset     Diabetes Mother      Depression Mother      Asthma Mother      Diabetes Father      Breast Cancer Maternal Aunt      in late 30's early 40's     Breast Cancer Other 30     maternal cousin     Hypertension No family hx of      Cerebrovascular Disease No family hx of      Thyroid Disease No family hx of      Glaucoma No family hx of      Macular Degeneration No family hx of      Anxiety Disorder No family hx of      Bleeding Disorder No family hx of      Liver Disease No family hx of            Reviewed and updated as needed this visit by clinical staff  Tobacco  Allergies  Meds  Problems  Med Hx  Surg Hx  Fam Hx  Soc Hx        Reviewed and updated as needed this visit by  "Provider  Allergies  Meds  Problems         ROS:  Constitutional, HEENT, cardiovascular, pulmonary, gi and gu systems are negative, except as otherwise noted.    OBJECTIVE:     /78 (BP Location: Left arm, Patient Position: Chair, Cuff Size: Adult Regular)  Pulse (!) 18  Temp 97.8  F (36.6  C) (Oral)  Resp 18  Ht 5' 3.25\" (1.607 m)  Wt 187 lb (84.8 kg)  SpO2 99%  Breastfeeding? No  BMI 32.86 kg/m2  Body mass index is 32.86 kg/(m^2).  GENERAL: healthy, alert and no distress  NECK: no adenopathy, no asymmetry, masses, or scars and thyroid normal to palpation  RESP: lungs clear to auscultation - no rales, rhonchi or wheezes  BREAST: normal without masses, tenderness or nipple discharge and no palpable axillary masses or adenopathy, site of pain is pec muscle  CV: regular rate and rhythm, normal S1 S2, no S3 or S4, no murmur, click or rub, no peripheral edema and peripheral pulses strong  ABDOMEN: soft, nontender, no hepatosplenomegaly, no masses and bowel sounds normal  MS: no gross musculoskeletal defects noted, no edema    Diagnostic Test Results:  none     ASSESSMENT/PLAN:     1. Chest wall pain  Reassured and monitor.  Offered imaging but deferred for now.  Follow up if pain persists over the next 2 weeks.    2. Elevated ALT measurement  Recheck in 1 month after restarting lamisil.      FUTURE APPOINTMENTS:       - Follow-up visit in 1 month for lab.    Denice Varner MD  [facility] [address]    "

## 2018-11-26 NOTE — PATIENT INSTRUCTIONS
At [facility], we strive to deliver an exceptional experience to you, every time we see you.  If you receive a survey in the mail, please send us back your thoughts. We really do value your feedback.    Based on your medical history, these are the current health maintenance/preventive care services that you are due for (some may have been done at this visit.)  Health Maintenance Due   Topic Date Due     HIV SCREEN (SYSTEM ASSIGNED)  12/11/2013     INFLUENZA VACCINE (1) 09/01/2018     CHLAMYDIA SCREENING  09/25/2018         Suggested websites for health information:  Www.[employer]X.FightMe : Up to date and easily searchable information on multiple topics.  Www.[employer].gov : medication info, interactive tutorials, watch real surgeries online  Www.familydoctor.org : good info from the Academy of Family Physicians  Www.cdc.gov : public health info, travel advisories, epidemics (H1N1)  Www.aap.org : children's health info, normal development, vaccinations  Www.health.[facility].[place].us : [place] dept of health, public health issues in [place], N1N1    Your care team:                            Family Medicine Internal Medicine   MD Denzel Crump MD Shantel Branch-Fleming, MD Katya Georgiev PA-C Nam Ho, MD Pediatrics   CASSIDY Cordova, MD Simran Wolfe CNP, MD Deborah Mielke, MD Kim Thein, APRN CNP      Clinic hours: Monday - Thursday 7 am-7 pm; Fridays 7 am-5 pm.   Urgent care: Monday - Friday 11 am-9 pm; Saturday and Sunday 9 am-5 pm.  Pharmacy : Monday -Thursday 8 am-8 pm; Friday 8 am-6 pm; Saturday and Sunday 9 am-5 pm.     Clinic: [phone]   Pharmacy: [phone]

## 2019-01-07 ENCOUNTER — APPOINTMENT (OUTPATIENT)
Dept: OPTOMETRY | Facility: CLINIC | Age: 24
End: 2019-01-07
Payer: COMMERCIAL

## 2019-01-07 PROCEDURE — 92340 FIT SPECTACLES MONOFOCAL: CPT | Performed by: OPTOMETRIST

## 2019-01-17 ENCOUNTER — OFFICE VISIT (OUTPATIENT)
Dept: FAMILY MEDICINE | Facility: CLINIC | Age: 24
End: 2019-01-17
Payer: COMMERCIAL

## 2019-01-17 VITALS
OXYGEN SATURATION: 100 % | WEIGHT: 187 LBS | BODY MASS INDEX: 33.13 KG/M2 | DIASTOLIC BLOOD PRESSURE: 74 MMHG | HEIGHT: 63 IN | HEART RATE: 92 BPM | TEMPERATURE: 97.9 F | SYSTOLIC BLOOD PRESSURE: 125 MMHG | RESPIRATION RATE: 16 BRPM

## 2019-01-17 DIAGNOSIS — M25.362 KNEE INSTABILITY, LEFT: ICD-10-CM

## 2019-01-17 DIAGNOSIS — Z11.4 SCREENING FOR HIV (HUMAN IMMUNODEFICIENCY VIRUS): ICD-10-CM

## 2019-01-17 DIAGNOSIS — Z28.21 VACCINATION NOT CARRIED OUT BECAUSE OF PATIENT REFUSAL: ICD-10-CM

## 2019-01-17 DIAGNOSIS — Z11.3 SCREENING EXAMINATION FOR VENEREAL DISEASE: ICD-10-CM

## 2019-01-17 DIAGNOSIS — M25.562 LEFT MEDIAL KNEE PAIN: Primary | ICD-10-CM

## 2019-01-17 PROCEDURE — 87491 CHLMYD TRACH DNA AMP PROBE: CPT | Performed by: FAMILY MEDICINE

## 2019-01-17 PROCEDURE — 87389 HIV-1 AG W/HIV-1&-2 AB AG IA: CPT | Performed by: FAMILY MEDICINE

## 2019-01-17 PROCEDURE — 99214 OFFICE O/P EST MOD 30 MIN: CPT | Performed by: FAMILY MEDICINE

## 2019-01-17 PROCEDURE — 36415 COLL VENOUS BLD VENIPUNCTURE: CPT | Performed by: FAMILY MEDICINE

## 2019-01-17 PROCEDURE — 87591 N.GONORRHOEAE DNA AMP PROB: CPT | Performed by: FAMILY MEDICINE

## 2019-01-17 RX ORDER — SULINDAC 200 MG/1
200 TABLET ORAL 2 TIMES DAILY WITH MEALS
Qty: 60 TABLET | Refills: 1 | Status: SHIPPED | OUTPATIENT
Start: 2019-01-17 | End: 2019-01-17

## 2019-01-17 RX ORDER — SULINDAC 200 MG/1
200 TABLET ORAL 2 TIMES DAILY WITH MEALS
Qty: 60 TABLET | Refills: 1 | Status: SHIPPED | OUTPATIENT
Start: 2019-01-17 | End: 2020-10-29

## 2019-01-17 ASSESSMENT — PAIN SCALES - GENERAL: PAINLEVEL: MODERATE PAIN (5)

## 2019-01-17 ASSESSMENT — MIFFLIN-ST. JEOR: SCORE: 1576.32

## 2019-01-17 NOTE — PROGRESS NOTES
"  SUBJECTIVE:   Birdie Ellis is a 23 year old female who presents to clinic today for the following health issues:    -possibly discuss prescription for Vitamin C    Musculoskeletal problem/pain      Duration: x1 month     Description  Location: Left knee    Intensity:  5/10    Accompanying signs and symptoms: swelling    History  Previous similar problem: no   Previous evaluation:  none    Precipitating or alleviating factors:  Trauma or overuse: no   Aggravating factors include: sitting, getting up and bending    Therapies tried and outcome: Gel helps relieve pain for some time    She doesn't remember any trauma that caused the pain. She tried some topical gel for pain that her dad had. It cools it down, but doesn't help much with the pain. She denies mechanical symptoms. The pain is at its worst when she is putting her weight on the leg, especially when carrying her infant down stairs, she is worried about the leg going out on her and falling.       Past medical, family, and social histories, medications, and allergies are reviewed and updated in Epic.     ROS:  Constitutional, HEENT, cardiovascular, pulmonary, GI and  systems are negative, except as otherwise noted.    This document serves as a record of the services and decisions personally performed and made by Dr. Lopes. It was created on his behalf by Birgit Kahn, a trained medical scribe. The creation of this document is based the provider's statements to the medical scribe.  Birgit Kahn January 17, 2019 5:41 PM     OBJECTIVE:                                                    /74 (BP Location: Left arm, Patient Position: Chair, Cuff Size: Adult Regular)   Pulse 92   Temp 97.9  F (36.6  C) (Oral)   Resp 16   Ht 1.607 m (5' 3.25\")   Wt 84.8 kg (187 lb)   SpO2 100%   BMI 32.86 kg/m     Body mass index is 32.86 kg/m .   GENERAL: healthy, alert and no distress  EYES: Eyes grossly normal to inspection, PERRL, EOMI, sclerae white and " conjunctivae normal  MS: medial joint line tenderness anteriorly. No effusion detected. No tenderness associated with the patella/tendon/anserine bursa.  SKIN: no suspicious lesions or rashes  NEURO: Normal strength and tone, sensory exam grossly normal, mentation intact, oriented times 3 and cranial nerves 2-12 intact  PSYCH: mentation appears normal, affect normal/bright     ASSESSMENT/PLAN:                                                      (M25.562) Left medial knee pain  (primary encounter diagnosis)  (M25.362) Knee instability, left  Comment: Rule out MMT  Plan: MR Knee Left w/o Contrast, ORTHO          REFERRAL, sulindac (CLINORIL) 200 MG tablet,         She can also add acetaminophen for pain    (Z11.3) Screening examination for venereal disease  Comment: asymptomatic screening discussed and offered to patient, patient agrees.   Plan: NEISSERIA GONORRHOEA PCR, CHLAMYDIA TRACHOMATIS        PCR          (Z11.4) Screening for HIV (human immunodeficiency virus)  Comment: indications for screening discussed with the patient  Plan: HIV Screening          (Z28.21) Vaccination not carried out because of patient refusal  Comment: Influenza vaccine offered but declined by the patient.  Plan:     The information in this document, created by the medical scribe for me, accurately reflects the services I personally performed and the decisions made by me. I have reviewed and approved this document for accuracy prior to leaving the patient care area. January 17, 2019 5:41 PM     James Lopes MD

## 2019-01-17 NOTE — LETTER
January 22, 2019      Birdie Ellis  [address]   [place] [address]        Dear Ms.Lira Ellis,    We are writing to inform you of your test results. All of your labs were normal. If you have any questions or concerns, please call the clinic at the number listed above.       Sincerely,    James Lopes MD/mike    Resulted Orders   NEISSERIA GONORRHOEA PCR   Result Value Ref Range    Specimen Descrip Urine     N Gonorrhea PCR Negative NEG^Negative      Comment:      Negative for N. gonorrhoeae rRNA by transcription mediated amplification.  A negative result by transcription mediated amplification does not preclude   the presence of N. gonorrhoeae infection because results are dependent on   proper and adequate collection, absence of inhibitors, and sufficient rRNA to   be detected.     CHLAMYDIA TRACHOMATIS PCR   Result Value Ref Range    Specimen Description Urine     Chlamydia Trachomatis PCR Negative NEG^Negative      Comment:      Negative for C. trachomatis rRNA by transcription mediated amplification.  A negative result by transcription mediated amplification does not preclude   the presence of C. trachomatis infection because results are dependent on   proper and adequate collection, absence of inhibitors, and sufficient rRNA to   be detected.     HIV Screening   Result Value Ref Range    HIV Antigen Antibody Combo Nonreactive NR^Nonreactive          Comment:      HIV-1 p24 Ag & HIV-1/HIV-2 Ab Not Detected

## 2019-01-17 NOTE — PATIENT INSTRUCTIONS
Please call [facility]: [phone] to schedule your Knee MRI.      At [facility], we strive to deliver an exceptional experience to you, every time we see you.  If you receive a survey in the mail, please send us back your thoughts. We really do value your feedback.    Based on your medical history, these are the current health maintenance/preventive care services that you are due for (some may have been done at this visit.)  Health Maintenance Due   Topic Date Due     HIV SCREEN (SYSTEM ASSIGNED)  12/11/2013     INFLUENZA VACCINE (1) 09/01/2018     CHLAMYDIA SCREENING  09/25/2018         Suggested websites for health information:  Www.[facility].org : Up to date and easily searchable information on multiple topics.  Www.medlineplus.gov : medication info, interactive tutorials, watch real surgeries online  Www.familydoctor.org : good info from the Academy of Family Physicians  Www.cdc.gov : public health info, travel advisories, epidemics (H1N1)  Www.aap.org : children's health info, normal development, vaccinations  Www.health.[facility].mn.us : [place] dept of health, public health issues in [place], N1N1    Your care team:                            Family Medicine Internal Medicine   MD Denzel Crump MD Shantel Branch-Fleming, MD Katya Georgiev PA-C Nam Ho, MD Pediatrics   CASSIDY Cordova, MD Simran Wolfe CNP, MD Deborah Mielke, MD Kim Thein, APRN [facility]      Clinic hours: Monday - Thursday 7 am-7 pm; Fridays 7 am-5 pm.   Urgent care: Monday - Friday 11 am-9 pm; Saturday and Sunday 9 am-5 pm.  Pharmacy : Monday -Thursday 8 am-8 pm; Friday 8 am-6 pm; Saturday and Sunday 9 am-5 pm.     Clinic: [phone]   Pharmacy: [phone]           Patient Education     The Kneecap (Patella) and Knee Joint    The kneecap (patella) is a small triangular bone. It is just one of the many parts that make up the  knee joint. Some of the other parts are muscles, ligaments, and leg bones. The kneecap provides leverage for your muscles as they bend and straighten the leg. It also protects the knee joint.    Quadriceps muscles. These are at the front of the thigh. They help the kneecap slide against the thighbone. They also help to straighten the leg.    Kneecap. This allows the quadriceps muscles to work better as they tighten. The kneecap also protects the bones and tissues under it.    Retinacula. These are fibrous bands on the sides of the knee. They help hold the kneecap in place.     Patellar tendon. This is a fibrous cord that connects the kneecap to the shinbone.  The kneecap up close  Take a closer look at this small bone to see how it works.    From the front, you can see the kneecap s slightly rounded, shield-like shape.    From the back, you can see cartilage. This is tough, cushiony tissue that covers the bone. It helps the kneecap slide easily against the thighbone.  From the top, you can see that the kneecap sits in a groove or  track  in the thighbone.       A closer view of the kneecap shows the difference between the smooth cartilage and the rougher bone beneath.  Date Last Reviewed: 5/1/2018 2000-2018 [employer]. [address] [place] [address], [place] [address]. All rights reserved. This information is not intended as a substitute for professional medical care. Always follow your healthcare professional's instructions.           Patient Education     Knee Pain with Possible Torn Meniscus    The meniscus is a tough cartilage pad that cushions the inside of the knee joint. It helps absorb the shock from movement. It also spreads the weight of your body evenly across the knee joint. This prevents excess wear and tear to the bones of that joint.  The most common causes of meniscal tears are injuries, especially related to sports and degenerative disease that happens with aging.  A meniscus tear commonly  happens during a twisting injury when the knee is bent. This causes pain, swelling, reduced movement of the knee, and trouble walking. There may be popping, clicking, joint locking or inability to completely straighten the knee. Ligaments of the knee may also be injured.  A torn meniscus is diagnosed by physical exam and X-rays. In the case of a severe injury, the knee may be too painful to examine fully. A more accurate exam can be done after the initial swelling goes down. An MRI may be done to make a final diagnosis.  If your healthcare provider suspects a meniscal injury, you will treat your knee with ice and rest and preventing movement of the knee. A splint or knee brace that keeps your leg straight may be put on to protect the joint. Depending on the severity of the injury, surgery may be needed. A cartilage injury may take 4 to 12 weeks to heal depending on how bad it is.  Home care    Stay off the injured leg as much as possible until you can walk on it without pain. If you have a lot of pain when walking, crutches or a walker may be prescribed. (These can be rented or bought at many pharmacies and surgical or orthopedic supply stores). Follow your healthcare provider's advice about when to begin putting weight on that leg.    Keep your leg elevated to reduce pain and swelling. When sleeping, place a pillow under the injured leg. When sitting, support the injured leg so it is above heart level. This is very important during the first 48 hours.    Apply an ice pack over the injured area for 15 to 20 minutes every 3 to 6 hours. You should do this for the first 24 to 48 hours. You can make an ice pack by filling a plastic bag that seals at the top with ice cubes and then wrapping it with a thin towel. Continue to use ice packs for relief of pain and swelling as needed. As the ice melts, be careful not to get your wrap, splint, or cast wet. After 48 hours, apply heat (warm shower or warm bath) for 15 to 20  minutes several times a day, or alternate ice and heat. You can place the ice pack directly over the splint. If you have to wear a hook-and-loop knee brace, you can open it to apply the ice pack, or heat, directly to the knee. Never put ice directly on the skin. Always wrap the ice in a towel or other type of cloth.    You may use over-the-counter pain medicine to control pain, unless another pain medicine was prescribed. If you have chronic liver or kidney disease or ever had a stomach ulcer or gastrointestinal bleeding, talk with your healthcare provider before using these medicines.    If you were given a splint, keep it dry at all times. Bathe with your splint out of the water. Protect it with a large plastic bag that is rubber-banded or taped at the top end. If a fiberglass splint gets wet, you can dry it with a hair dryer set on cool. If you have a hook-and-loop knee brace, you can remove this to bathe, unless told otherwise.    Check with your healthcare provider before returning to sports or full work duties.  Follow-up care  Follow up with your healthcare provider, or as advised. This is usually within 1 to 2 weeks. Further testing may be required to check the extent of your injury.  If X-rays were taken, you will be told of any new findings that may affect your care.     Call 911  Call 911 if you have:     Shortness of breath    Chest pain   When to seek medical advice  Call your healthcare provider right away if any of these occur:    Toes or foot gets swollen, cold, blue, numb, or tingly    Pain or swelling spreads over the knee or calf    Warmth or redness appears over the knee or calf    Fever of 100.4 F (38 C) or higher, or as directed by your healthcare provider    Chills  Date Last Reviewed: 5/1/2018 2000-2018 [employer]. [address] [place] [address], [place] [address]. All rights reserved. This information is not intended as a substitute for professional medical care. Always follow your  healthcare professional's instructions.

## 2019-01-18 LAB — HIV 1+2 AB+HIV1 P24 AG SERPL QL IA: NONREACTIVE

## 2019-01-20 LAB
C TRACH DNA SPEC QL NAA+PROBE: NEGATIVE
N GONORRHOEA DNA SPEC QL NAA+PROBE: NEGATIVE
SPECIMEN SOURCE: NORMAL
SPECIMEN SOURCE: NORMAL

## 2019-01-28 ENCOUNTER — ANCILLARY PROCEDURE (OUTPATIENT)
Dept: MRI IMAGING | Facility: CLINIC | Age: 24
End: 2019-01-28
Payer: COMMERCIAL

## 2019-01-28 DIAGNOSIS — M25.562 LEFT MEDIAL KNEE PAIN: ICD-10-CM

## 2019-01-28 DIAGNOSIS — M25.362 KNEE INSTABILITY, LEFT: ICD-10-CM

## 2019-01-28 PROCEDURE — 73721 MRI JNT OF LWR EXTRE W/O DYE: CPT | Mod: LT | Performed by: RADIOLOGY

## 2019-01-30 ENCOUNTER — OFFICE VISIT (OUTPATIENT)
Dept: ORTHOPEDICS | Facility: CLINIC | Age: 24
End: 2019-01-30
Payer: COMMERCIAL

## 2019-01-30 ENCOUNTER — ANCILLARY PROCEDURE (OUTPATIENT)
Dept: GENERAL RADIOLOGY | Facility: CLINIC | Age: 24
End: 2019-01-30
Payer: COMMERCIAL

## 2019-01-30 VITALS
SYSTOLIC BLOOD PRESSURE: 110 MMHG | WEIGHT: 184 LBS | HEIGHT: 63 IN | HEART RATE: 98 BPM | BODY MASS INDEX: 32.6 KG/M2 | DIASTOLIC BLOOD PRESSURE: 72 MMHG | OXYGEN SATURATION: 99 % | RESPIRATION RATE: 16 BRPM

## 2019-01-30 DIAGNOSIS — M25.562 ACUTE PAIN OF LEFT KNEE: Primary | ICD-10-CM

## 2019-01-30 DIAGNOSIS — M25.462 EFFUSION, LEFT KNEE: ICD-10-CM

## 2019-01-30 PROCEDURE — 73562 X-RAY EXAM OF KNEE 3: CPT | Mod: LT

## 2019-01-30 PROCEDURE — 99244 OFF/OP CNSLTJ NEW/EST MOD 40: CPT | Performed by: ORTHOPAEDIC SURGERY

## 2019-01-30 ASSESSMENT — PAIN SCALES - GENERAL: PAINLEVEL: MILD PAIN (3)

## 2019-01-30 ASSESSMENT — MIFFLIN-ST. JEOR: SCORE: 1562.71

## 2019-01-30 NOTE — LETTER
1/30/2019         RE: Birdie Ellis  [address]  [place] [address]        Dear Colleague,    Thank you for referring your patient, Birdie Ellis, to the [facility]. Please see a copy of my visit note below.    CHIEF COMPLAINT:   Chief Complaint   Patient presents with     Knee left     Left knee pain for over a month. No known injury. Painful especially when extending the leg swollen at the end of the day, constant throbbing, tender on both sides on the bottom of the knee cap and the pain almost feels like it is inside of my knee. Her knee has felt unstable, and very painful.Feels like the knee is going to give out especially when carrying her infant down stairs.Treatment: Topical get,   .  Birdie Ellis is seen today in the [facility] for evaluation of left knee pain at the request of Dr. James Lopes MD      HISTORY OF PRESENT ILLNESS    Birdie Ellis is a 23 year old female seen for evaluation of ongoing left knee pain with no known injury.   Pain has been present for more than 1 months. Locates pain throughout the knee. Aggravated with waking, stairs. Treatment has been topical gel, prescription nsaids. Pain in morning getting up. Worse with going down stairs, especially carrying 10month infant. Pain getting up off of floor. Locates pain to front of the knee. +swelling. No locking, catching. Feels like it will give out, but hasn't. Denies prior knee problems.    Present symptoms: pain anteriorly and diffuse, pain dull/achy , mild pain, mild swelling, no catching/popping, no locking, no giving way.    Pain severity: 3/10  Frequency of symptoms: are constant  Exacerbating Factors: weight bearing, stairs, [id number], prolonged sitting  Relieving Factors: rest, positional changes  Night Pain: Yes  Pain while at rest: Yes   Numbness or tingling: No   Patient has tried:     NSAIDS: Yes      Physical Therapy:  "No      Activity modification: Yes      Bracing: No      Injections: No     Ice: Yes      Assistive device:  No      Other PMH:  has a past medical history of Learning disability. She also has no past medical history of Amblyopia, Arthritis, Diabetes (H), Diabetic retinopathy (H), Glaucoma, Hypertension, Macular degeneration, Nonsenile cataract, Retinal detachment, Strabismus, or Uveitis.  Patient Active Problem List   Diagnosis     CARDIOVASCULAR SCREENING; LDL GOAL LESS THAN 160     Learning disability     Cold sore     Acute midline low back pain without sciatica     Toenail deformity     Elevated ALT measurement       Surgical Hx:  has a past surgical history that includes no history of surgery.    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   reports that  has never smoked. she has never used smokeless tobacco. She reports that she does not drink alcohol or use drugs.    Family Hx: family history includes Asthma in her mother; Breast Cancer in her maternal aunt; Breast Cancer (age of onset: 30) in an other family member; Depression in her mother; Diabetes in her father and mother.    REVIEW OF SYSTEMS: 10 point ROS neg other than the symptoms noted above in the HPI and PMH. Notables include  CONSTITUTIONAL:NEGATIVE for fever, chills, change in weight  INTEGUMENTARY/SKIN: NEGATIVE for worrisome rashes, moles or lesions  MUSCULOSKELETAL:See HPI above  NEURO: NEGATIVE for weakness, dizziness or paresthesias    PHYSICAL EXAM:  /72   Pulse 98   Resp 16   Ht 1.607 m (5' 3.25\") "   Wt 83.5 kg (184 lb)   SpO2 99%   BMI 32.34 kg/m      GENERAL APPEARANCE: healthy, alert, no distress  SKIN: no suspicious lesions or rashes  NEURO: Normal strength and tone, mentation intact and speech normal  PSYCH:  mentation appears normal and affect normal, not anxious  RESPIRATORY: No increased work of breathing.  HANDS: no clubbing, nail pitting  LYMPH: no palpable popliteal lymphadenopathy.    BILATERAL LOWER EXTREMITIES:  Gait: subtle favors the left.  Alignment: neutral  No gross deformities or masses.  No Quad atrophy, strength normal.  Intact sensation deep peroneal nerve, superficial peroneal nerve, med/lat tibial nerve, sural nerve, saphenous nerve  Intact EHL, EDL, TA, FHL, GS, quadriceps hamstrings and hip flexors  Toes warm and well perfused, brisk capillary refill. Palpable 2+ dp pulses.  Bilateral calf soft and nttp or squeeze.  DTRs: achilles 2+, patella 2+.  Edema: none    LEFT KNEE EXAM:    Skin: intact, no ecchymosis or erythema  Squat: limited by pain.     ROM: 0 extension to 140 flexion  Tight hamstrings on straight leg raise.  Effusion: trace to small  Tender: NTTP med/lat joint line, anterior or posterior knee  McMurrays: negative    MCL: stable, and non-painful at both 0 and 30 degrees knee flexion  Varus stress: stable, and non-painful at both 0 and 30 degrees knee flexion  Lachmans: neg, firm endpoint  Posterior Drawer stable  Patellofemoral joint:                Apprehension: negative              Crepitations: mild   Grind: equivocal.    RIGHT KNEE EXAM:    Skin: intact, no ecchymosis or erythema  ROM: 0 extension to 140 flexion  Tight hamstrings on straight leg raise.  Effusion: none  Tender: NTTP med/lat joint line, anterior or posterior knee  McMurrays: negative    MCL: stable, and non-painful at both 0 and 30 degrees knee flexion  Varus stress: stable, and non-painful at both 0 and 30 degrees knee flexion  Lachmans: neg, firm endpoint  Posterior Drawer stable  Patellofemoral  joint:                Apprehension: negative              Crepitations: minimal    X-RAY:  3 views left knee from 1/30/2019 were reviewed in clinic today. On my review, no obvious fractures or dislocations.    MRI:  MRI left knee from 1/28/2019 was reviewed in clinic today.    Impression:  1. Moderate size left knee joint effusion with synovitis which is  nonspecific. However, correlate clinically as inflammatory arthropathy  cannot be excluded.  2. Borderline patella ginger with Insall-Salvati ratio of 1.2 and soft  tissue edema in the superolateral aspect of Hoffa's fat pad which can  be seen with patellofemoral tracking syndrome. No full-thickness  cartilage loss in the patellofemoral compartment.  3. The ACL, PCL, medial, and lateral menisci as well as the supporting  structures are intact.  4. No full-thickness cartilage loss in the medial or lateral  femorotibial joint compartments.       ASSESSMENT/PLAN: Birdie Ellis is a 23 year old female with acute left knee pain, effusion.     * reviewed imaging studies, no obvious bony or soft tissue abnormality.  * there is swelling in the knee, unclear reason, could be synovitis or inflammation of the lining within the knee.  * anteiror pain likely patello-femoral in nature  * recommend rest, activity modification, NSAIDS, knee brace.  * discussed aspiration, patient elects NOT to proceed.  * Physical Therapy.    * return to clinic as needed.      Benoit Pinzon M.D., M.S.  Dept. of Orthopaedic Surgery  [facility]        Again, thank you for allowing me to participate in the care of your patient.        Sincerely,        Benoit Pinzon MD

## 2019-01-30 NOTE — PROGRESS NOTES
CHIEF COMPLAINT:   Chief Complaint   Patient presents with     Knee left     Left knee pain for over a month. No known injury. Painful especially when extending the leg swollen at the end of the day, constant throbbing, tender on both sides on the bottom of the knee cap and the pain almost feels like it is inside of my knee. Her knee has felt unstable, and very painful.Feels like the knee is going to give out especially when carrying her infant down stairs.Treatment: Topical get,   .  Birdie Ellis is seen today in the [facility] for evaluation of left knee pain at the request of Dr. James Lopes MD      HISTORY OF PRESENT ILLNESS    Birdie Ellis is a 23 year old female seen for evaluation of ongoing left knee pain with no known injury.   Pain has been present for more than 1 months. Locates pain throughout the knee. Aggravated with waking, stairs. Treatment has been topical gel, prescription nsaids. Pain in morning getting up. Worse with going down stairs, especially carrying 10month infant. Pain getting up off of floor. Locates pain to front of the knee. +swelling. No locking, catching. Feels like it will give out, but hasn't. Denies prior knee problems.    Present symptoms: pain anteriorly and diffuse, pain dull/achy , mild pain, mild swelling, no catching/popping, no locking, no giving way.    Pain severity: 3/10  Frequency of symptoms: are constant  Exacerbating Factors: weight bearing, stairs, toms-oh-tetwr, prolonged sitting  Relieving Factors: rest, positional changes  Night Pain: Yes  Pain while at rest: Yes   Numbness or tingling: No   Patient has tried:     NSAIDS: Yes      Physical Therapy: No      Activity modification: Yes      Bracing: No      Injections: No     Ice: Yes      Assistive device:  No      Other PMH:  has a past medical history of Learning disability. She also has no past medical history of Amblyopia, Arthritis, Diabetes (H), Diabetic  "retinopathy (H), Glaucoma, Hypertension, Macular degeneration, Nonsenile cataract, Retinal detachment, Strabismus, or Uveitis.  Patient Active Problem List   Diagnosis     CARDIOVASCULAR SCREENING; LDL GOAL LESS THAN 160     Learning disability     Cold sore     Acute midline low back pain without sciatica     Toenail deformity     Elevated ALT measurement       Surgical Hx:  has a past surgical history that includes no history of surgery.    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   reports that  has never smoked. she has never used smokeless tobacco. She reports that she does not drink alcohol or use drugs.    Family Hx: family history includes Asthma in her mother; Breast Cancer in her maternal aunt; Breast Cancer (age of onset: 30) in an other family member; Depression in her mother; Diabetes in her father and mother.    REVIEW OF SYSTEMS: 10 point ROS neg other than the symptoms noted above in the HPI and PMH. Notables include  CONSTITUTIONAL:NEGATIVE for fever, chills, change in weight  INTEGUMENTARY/SKIN: NEGATIVE for worrisome rashes, moles or lesions  MUSCULOSKELETAL:See HPI above  NEURO: NEGATIVE for weakness, dizziness or paresthesias    PHYSICAL EXAM:  /72   Pulse 98   Resp 16   Ht 1.607 m (5' 3.25\")   Wt 83.5 kg (184 lb)   SpO2 99%   BMI 32.34 kg/m     GENERAL APPEARANCE: healthy, alert, no distress  SKIN: no suspicious lesions or rashes  NEURO: Normal strength and tone, mentation intact and speech normal  PSYCH:  mentation appears normal and affect " normal, not anxious  RESPIRATORY: No increased work of breathing.  HANDS: no clubbing, nail pitting  LYMPH: no palpable popliteal lymphadenopathy.    BILATERAL LOWER EXTREMITIES:  Gait: subtle favors the left.  Alignment: neutral  No gross deformities or masses.  No Quad atrophy, strength normal.  Intact sensation deep peroneal nerve, superficial peroneal nerve, med/lat tibial nerve, sural nerve, saphenous nerve  Intact EHL, EDL, TA, FHL, GS, quadriceps hamstrings and hip flexors  Toes warm and well perfused, brisk capillary refill. Palpable 2+ dp pulses.  Bilateral calf soft and nttp or squeeze.  DTRs: achilles 2+, patella 2+.  Edema: none    LEFT KNEE EXAM:    Skin: intact, no ecchymosis or erythema  Squat: limited by pain.     ROM: 0 extension to 140 flexion  Tight hamstrings on straight leg raise.  Effusion: trace to small  Tender: NTTP med/lat joint line, anterior or posterior knee  McMurrays: negative    MCL: stable, and non-painful at both 0 and 30 degrees knee flexion  Varus stress: stable, and non-painful at both 0 and 30 degrees knee flexion  Lachmans: neg, firm endpoint  Posterior Drawer stable  Patellofemoral joint:                Apprehension: negative              Crepitations: mild   Grind: equivocal.    RIGHT KNEE EXAM:    Skin: intact, no ecchymosis or erythema  ROM: 0 extension to 140 flexion  Tight hamstrings on straight leg raise.  Effusion: none  Tender: NTTP med/lat joint line, anterior or posterior knee  McMurrays: negative    MCL: stable, and non-painful at both 0 and 30 degrees knee flexion  Varus stress: stable, and non-painful at both 0 and 30 degrees knee flexion  Lachmans: neg, firm endpoint  Posterior Drawer stable  Patellofemoral joint:                Apprehension: negative              Crepitations: minimal    X-RAY:  3 views left knee from 1/30/2019 were reviewed in clinic today. On my review, no obvious fractures or dislocations.    MRI:  MRI left knee from 1/28/2019 was reviewed in  clinic today.    Impression:  1. Moderate size left knee joint effusion with synovitis which is  nonspecific. However, correlate clinically as inflammatory arthropathy  cannot be excluded.  2. Borderline patella ginger with Insall-Salvati ratio of 1.2 and soft  tissue edema in the superolateral aspect of Hoffa's fat pad which can  be seen with patellofemoral tracking syndrome. No full-thickness  cartilage loss in the patellofemoral compartment.  3. The ACL, PCL, medial, and lateral menisci as well as the supporting  structures are intact.  4. No full-thickness cartilage loss in the medial or lateral  femorotibial joint compartments.       ASSESSMENT/PLAN: Birdie Ellis is a 23 year old female with acute left knee pain, effusion.     * reviewed imaging studies, no obvious bony or soft tissue abnormality.  * there is swelling in the knee, unclear reason, could be synovitis or inflammation of the lining within the knee.  * anteiror pain likely patello-femoral in nature  * recommend rest, activity modification, NSAIDS, knee brace.  * discussed aspiration, patient elects NOT to proceed.  * Physical Therapy.    * return to clinic as needed.      Benoit Pinzon M.D., M.S.  Dept. of Orthopaedic Surgery  [facility]

## 2019-05-28 ENCOUNTER — APPOINTMENT (OUTPATIENT)
Dept: OPTOMETRY | Facility: CLINIC | Age: 24
End: 2019-05-28
Payer: COMMERCIAL

## 2019-05-28 PROCEDURE — 92340 FIT SPECTACLES MONOFOCAL: CPT | Performed by: OPTOMETRIST

## 2019-09-19 PROBLEM — M54.50 ACUTE MIDLINE LOW BACK PAIN WITHOUT SCIATICA: Status: RESOLVED | Noted: 2018-08-21 | Resolved: 2019-09-19

## 2019-09-19 NOTE — PROGRESS NOTES
DISCHARGE SUMMARY    Birdie Ellis was seen 5 times for evaluation and treatment.  Patient did not return for further treatment and current status is unknown.  Due to short treatment duration, no objective or functional changes were made.  Please see goal flow sheet from episode noted date below and initial evaluation for further information.  Patient is discharged from therapy and therapy episode is resolved as of 09/19/19.      Linked Episodes   Type: Episode: Status: Noted: Resolved: Last update: Updated by:   PHYSICAL THERAPY LBP  8-21-18 Active 8/21/2018 9/21/2018 11:36 AM Gracy Payne, PT      Comments:

## 2019-09-26 ENCOUNTER — APPOINTMENT (OUTPATIENT)
Dept: OPTOMETRY | Facility: CLINIC | Age: 24
End: 2019-09-26
Payer: COMMERCIAL

## 2019-09-26 PROCEDURE — 92340 FIT SPECTACLES MONOFOCAL: CPT | Performed by: OPTOMETRIST

## 2020-10-29 ENCOUNTER — OFFICE VISIT (OUTPATIENT)
Dept: OPTOMETRY | Facility: CLINIC | Age: 25
End: 2020-10-29
Payer: COMMERCIAL

## 2020-10-29 DIAGNOSIS — H52.223 REGULAR ASTIGMATISM OF BOTH EYES: ICD-10-CM

## 2020-10-29 DIAGNOSIS — H10.13 ALLERGIC CONJUNCTIVITIS OF BOTH EYES: ICD-10-CM

## 2020-10-29 DIAGNOSIS — H52.13 MYOPIA OF BOTH EYES: ICD-10-CM

## 2020-10-29 DIAGNOSIS — Z01.00 EXAMINATION OF EYES AND VISION: Primary | ICD-10-CM

## 2020-10-29 PROCEDURE — 92015 DETERMINE REFRACTIVE STATE: CPT | Performed by: OPTOMETRIST

## 2020-10-29 PROCEDURE — 92014 COMPRE OPH EXAM EST PT 1/>: CPT | Performed by: OPTOMETRIST

## 2020-10-29 ASSESSMENT — REFRACTION_WEARINGRX
OD_AXIS: 091
OD_CYLINDER: +1.00
OS_AXIS: 088
OS_SPHERE: -3.50
SPECS_TYPE: SVL
OS_CYLINDER: +1.25
OD_SPHERE: -3.00

## 2020-10-29 ASSESSMENT — REFRACTION_MANIFEST
OD_CYLINDER: +1.00
OS_SPHERE: -3.75
OD_AXIS: 091
OD_SPHERE: -3.00
OS_CYLINDER: +1.75
OS_AXIS: 088

## 2020-10-29 ASSESSMENT — VISUAL ACUITY
OD_SC: 20/100
OS_SC: 20/200
CORRECTION_TYPE: GLASSES
METHOD: SNELLEN - LINEAR
OS_CC+: -1
OS_CC: 20/20
OD_CC: 20/20
OS_CC: 20/20
OD_SC+: -1
OD_CC: 20/20

## 2020-10-29 ASSESSMENT — CUP TO DISC RATIO
OD_RATIO: 0.2
OS_RATIO: 0.2

## 2020-10-29 ASSESSMENT — CONF VISUAL FIELD
OD_NORMAL: 1
OS_NORMAL: 1

## 2020-10-29 ASSESSMENT — TONOMETRY
OS_IOP_MMHG: 20
OD_IOP_MMHG: 17
IOP_METHOD: TONOPEN

## 2020-10-29 ASSESSMENT — SLIT LAMP EXAM - LIDS
COMMENTS: NORMAL
COMMENTS: NORMAL

## 2020-10-29 ASSESSMENT — EXTERNAL EXAM - LEFT EYE: OS_EXAM: NORMAL

## 2020-10-29 ASSESSMENT — EXTERNAL EXAM - RIGHT EYE: OD_EXAM: NORMAL

## 2020-10-29 NOTE — PATIENT INSTRUCTIONS
Recommend new glasses.    1 drop Pazeo both eyes once daily as needed for itchy watery eyes.    Recommend returning for dilated fundus exam. It is a more thorough exam of the retina.    Return in 1 year for a complete eye exam or sooner if needed.    Roger Polanco, OD

## 2020-10-29 NOTE — LETTER
10/29/2020         RE: Birdie Ellis  [address]  [place] [address]        Dear Colleague,    Thank you for referring your patient, Birdie Ellis, to the [facility]. Please see a copy of my visit note below.    Chief Complaint   Patient presents with     Annual Eye Exam      Accompanied by baby boy  Last Eye Exam: 10-  Dilated Previously: Yes    What are you currently using to see?  glasses       Distance Vision Acuity: Noticed gradual change in both eyes    Near Vision Acuity: Satisfied with vision while reading  with glasses    Eye Comfort: good/itchy eyes with allergies  Do you use eye drops? 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          Medical, surgical and family histories reviewed and updated 10/29/2020.       OBJECTIVE: See Ophthalmology exam    ASSESSMENT:    ICD-10-CM    1. Examination of eyes and vision  Z01.00 EYE EXAM (SIMPLE-NONBILLABLE)   2. Myopia of both eyes  H52.13 REFRACTION   3. Regular astigmatism of both eyes  H52.223 REFRACTION   4. Allergic conjunctivitis of both eyes  H10.13 EYE EXAM (SIMPLE-NONBILLABLE)     olopatadine (PAZEO) 0.7 % ophthalmic solution      PLAN:     Patient Instructions   Recommend new glasses.    1 drop Pazeo both eyes once daily as needed for itchy watery eyes.    Recommend returning for dilated fundus exam. It is a more thorough exam of the retina.    Return in 1 year for a complete eye exam or sooner if needed.    Roger Polanco, ANGEL           Again, thank you for allowing me to participate in the care of your patient.        Sincerely,        Roger Polanco, OD

## 2020-10-29 NOTE — PROGRESS NOTES
Chief Complaint   Patient presents with     Annual Eye Exam      Accompanied by baby boy  Last Eye Exam: 10-  Dilated Previously: Yes    What are you currently using to see?  glasses       Distance Vision Acuity: Noticed gradual change in both eyes    Near Vision Acuity: Satisfied with vision while reading  with glasses    Eye Comfort: good/itchy eyes with allergies  Do you use eye drops? 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          Medical, surgical and family histories reviewed and updated 10/29/2020.       OBJECTIVE: See Ophthalmology exam    ASSESSMENT:    ICD-10-CM    1. Examination of eyes and vision  Z01.00 EYE EXAM (SIMPLE-NONBILLABLE)   2. Myopia of both eyes  H52.13 REFRACTION   3. Regular astigmatism of both eyes  H52.223 REFRACTION   4. Allergic conjunctivitis of both eyes  H10.13 EYE EXAM (SIMPLE-NONBILLABLE)     olopatadine (PAZEO) 0.7 % ophthalmic solution      PLAN:     Patient Instructions   Recommend new glasses.    1 drop Pazeo both eyes once daily as needed for itchy watery eyes.    Recommend returning for dilated fundus exam. It is a more thorough exam of the retina.    Return in 1 year for a complete eye exam or sooner if needed.    Roger Polanco, OD

## 2020-11-13 ENCOUNTER — APPOINTMENT (OUTPATIENT)
Dept: OPTOMETRY | Facility: CLINIC | Age: 25
End: 2020-11-13
Payer: COMMERCIAL

## 2020-11-13 PROCEDURE — 92340 FIT SPECTACLES MONOFOCAL: CPT | Performed by: OPTOMETRIST

## 2020-11-19 ENCOUNTER — TRANSFERRED RECORDS (OUTPATIENT)
Dept: HEALTH INFORMATION MANAGEMENT | Facility: CLINIC | Age: 25
End: 2020-11-19
Payer: COMMERCIAL

## 2020-11-19 LAB — PAP-ABSTRACT: NORMAL

## 2021-04-28 ENCOUNTER — OFFICE VISIT (OUTPATIENT)
Dept: PODIATRY | Facility: CLINIC | Age: 26
End: 2021-04-28
Payer: COMMERCIAL

## 2021-04-28 VITALS
SYSTOLIC BLOOD PRESSURE: 113 MMHG | HEIGHT: 64 IN | BODY MASS INDEX: 33.63 KG/M2 | DIASTOLIC BLOOD PRESSURE: 73 MMHG | WEIGHT: 197 LBS

## 2021-04-28 DIAGNOSIS — M72.2 PLANTAR FASCIITIS: Primary | ICD-10-CM

## 2021-04-28 PROBLEM — Z97.5 IUD (INTRAUTERINE DEVICE) IN PLACE: Status: ACTIVE | Noted: 2021-01-08

## 2021-04-28 PROBLEM — O21.0 HYPEREMESIS GRAVIDARUM: Status: ACTIVE | Noted: 2020-01-31

## 2021-04-28 PROBLEM — O99.321 DRUG USE AFFECTING PREGNANCY IN FIRST TRIMESTER: Status: ACTIVE | Noted: 2020-02-23

## 2021-04-28 PROBLEM — O99.210 OBESITY IN PREGNANCY: Status: ACTIVE | Noted: 2020-09-18

## 2021-04-28 PROCEDURE — 99203 OFFICE O/P NEW LOW 30 MIN: CPT | Performed by: PODIATRIST

## 2021-04-28 RX ORDER — HYDROXYZINE PAMOATE 25 MG/1
CAPSULE ORAL
COMMUNITY
Start: 2021-01-19 | End: 2022-02-22

## 2021-04-28 RX ORDER — CLINDAMYCIN PHOSPHATE 11.9 MG/ML
SOLUTION TOPICAL
COMMUNITY
Start: 2021-01-04 | End: 2022-02-22

## 2021-04-28 RX ORDER — TERBINAFINE HYDROCHLORIDE 250 MG/1
1 TABLET ORAL DAILY
COMMUNITY
Start: 2021-01-04 | End: 2022-02-22

## 2021-04-28 ASSESSMENT — MIFFLIN-ST. JEOR: SCORE: 1619.62

## 2021-04-28 NOTE — LETTER
4/28/2021         RE: Birdie Ellis  [address]  [place] [address]        Dear Colleague,    Thank you for referring your patient, Birdie Ellis, to the [facility]. Please see a copy of my visit note below.    Subjective:    Patient is seen today as a new pt self referral with a 7 month(s) hx of bilateral heel pain left>R.   Points to the plantarmedial calcaneal tubercle.  Most painful upon rising in a.m. or after prolonged sitting.  Aggravated by activity and relieved by rest.  Has tried changing shoewear, OTC inserts, without much success.  Denies erythema, edema, ecchymosis, numbness, loss of strength.  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    ROS:  A 10-point review of systems was performed and is positive for that noted in the HPI and as seen below.  All other areas are negative.        Allergies   Allergen Reactions     Pollen Extract        Current Outpatient Medications   Medication Sig Dispense Refill     levonorgestrel (MIRENA) 20 MCG/24HR IUD 1 each by Intrauterine route       olopatadine (PAZEO) 0.7 % ophthalmic solution Apply 1 drop to eye daily as needed (for itchy eyes) 1 Bottle 11     clindamycin (CLEOCIN T) 1 % external solution USE AS A WASH TOPICALLY ON THE AFFECTED AREA ONCE DAILY       hydrOXYzine (VISTARIL) 25 MG capsule TAKE 1 TO 4 CAPSULES BY MOUTH THREE TIMES DAILY AS NEEDED FOR ANXIETY. DO NOT USE WITH ALPRAZOLAM       terbinafine (LAMISIL) 250 MG tablet Take 1 tablet by mouth daily         Patient Active Problem List   Diagnosis     CARDIOVASCULAR SCREENING; LDL GOAL LESS THAN 160     Learning disability     Cold sore     Toenail deformity     Elevated ALT measurement     Atopic dermatitis     Difficulty with family     Drug use affecting pregnancy in first trimester     Hyperemesis gravidarum     IUD (intrauterine device) in place     Obesity in pregnancy     Vaginal delivery       Past Medical  "History:   Diagnosis Date     Learning disability        Past Surgical History:   Procedure Laterality Date     NO HISTORY OF SURGERY         Family History   Problem Relation Age of Onset     Diabetes Mother      Depression Mother      Asthma Mother      Thyroid Disease Mother      Diabetes Father      Breast Cancer Maternal Aunt         in late 30's early 40's     Breast Cancer Other 30        maternal cousin     Hypertension No family hx of      Cerebrovascular Disease No family hx of      Glaucoma No family hx of      Macular Degeneration No family hx of      Anxiety Disorder No family hx of      Bleeding Disorder No family hx of      Liver Disease No family hx of        Social History     Tobacco Use     Smoking status: Never Smoker     Smokeless tobacco: Never Used   Substance Use Topics     Alcohol use: No         Objective:    Vitals: /73   Ht 1.619 m (5' 3.75\")   Wt 89.4 kg (197 lb)   BMI 34.08 kg/m    BMI: Body mass index is 34.08 kg/m .  Height: 5' 3.75\"    Constitutional/ general:  Pt is in no apparent distress, appears well-nourished.  Cooperative with history and physical exam.     Psych:  The patient answered questions appropriately.  Normal affect.  Seems to have reasonable expectations, in terms of treatment.     Eyes:  Visual scanning/ tracking without deficit.     Ears:  Response to auditory stimuli is normal.  No hearing aid devices.  Auricles in proper alignment.     Lymphatic:  Popliteal lymph nodes not enlarged.     Lungs:  Non labored breathing, non labored speech. No cough.  No audible wheezing. Even, quiet breathing.       Vascular:  Pedal pulses are palpable bilaterally for both the DP and PT arteries.  CFT < 3 sec.  No edema.  Pedal hair growth noted.     Neuro:  Alert and oriented x 3. Coordinated gait.  Light touch sensation is intact to the L4, L5, S1 distributions. No obvious deficits.  No evidence of neurological-based weakness, spasticity, or contracture in the lower " extremities.     Derm: Normal texture and turgor.  No erythema, ecchymosis, or cyanosis.  No open lesions.     Musculoskeletal:    Lower extremity muscle strength is normal.  Patient is ambulatory without an assistive device or brace.  No gross deformities.      Normal arch with weightbearing.   ROM is within normal limits.  Negative tinel's sign.  No side to side compression pain of the calcaneus.  Pain upon palpation to the bilateral plantarmedial  of the calcaneus.  No erythema, edema, ecchymosis, or subcutaneous masses noted.  No pain on palpation or stressing any tendons.  Negative Tinel's sign          Assessment:  Plantar Fasciitis right and left foot     Plan:   Discussed etiology and treatment options with the patient.  The potential causes and nature of plantar fasciitis were discussed with the patient.  We reviewed the natural history/prognosis of the condition and risks if left untreated.  These include chronic pain, other sites of pain due to gait changes, and potential plantar fascial rupture.      We discussed possible causes of the condition as it relates to the patients specific situation.      Conservative treatment options were reviewed:  appropriate shoes, avoidance of barefoot walking, inserts/orthoses, stretching, ice, massage, immobilization and NSAIDs.     We also reviewed the options of injection therapy and surgery.  However, it was made clear that surgery is only considered when conservative therapy fails.  The risks and benefits of injection therapy, and surgery were discussed.  Dispensed handout.       After thorough discussion and answering all questions, the patient elected to modifying activities, supportive shoes, ice, stretching, and not going barefoot.  Good house shoes at all times and I made recommendations.  Patient will get good shoes for outside the house and I made recommendations.  Discussed over-the-counter arch supports.  For one week patient to take ibuprofen 600 mg tid  after meals and will then stop.  Patient to stop medication if any GI upset or any other side effects.  We gave her heel cups today.   RTC in 4 weeks if not better otherwise prn.     Michael Boyce DPM, FACFAS          Again, thank you for allowing me to participate in the care of your patient.        Sincerely,        Michael Boyce DPM

## 2021-04-29 NOTE — PROGRESS NOTES
Subjective:    Patient is seen today as a new pt self referral with a 7 month(s) hx of bilateral heel pain left>R.   Points to the plantarmedial calcaneal tubercle.  Most painful upon rising in a.m. or after prolonged sitting.  Aggravated by activity and relieved by rest.  Has tried changing shoewear, OTC inserts, without much success.  Denies erythema, edema, ecchymosis, numbness, loss of strength.  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    ROS:  A 10-point review of systems was performed and is positive for that noted in the HPI and as seen below.  All other areas are negative.        Allergies   Allergen Reactions     Pollen Extract        Current Outpatient Medications   Medication Sig Dispense Refill     levonorgestrel (MIRENA) 20 MCG/24HR IUD 1 each by Intrauterine route       olopatadine (PAZEO) 0.7 % ophthalmic solution Apply 1 drop to eye daily as needed (for itchy eyes) 1 Bottle 11     clindamycin (CLEOCIN T) 1 % external solution USE AS A WASH TOPICALLY ON THE AFFECTED AREA ONCE DAILY       hydrOXYzine (VISTARIL) 25 MG capsule TAKE 1 TO 4 CAPSULES BY MOUTH THREE TIMES DAILY AS NEEDED FOR ANXIETY. DO NOT USE WITH ALPRAZOLAM       terbinafine (LAMISIL) 250 MG tablet Take 1 tablet by mouth daily         Patient Active Problem List   Diagnosis     CARDIOVASCULAR SCREENING; LDL GOAL LESS THAN 160     Learning disability     Cold sore     Toenail deformity     Elevated ALT measurement     Atopic dermatitis     Difficulty with family     Drug use affecting pregnancy in first trimester     Hyperemesis gravidarum     IUD (intrauterine device) in place     Obesity in pregnancy     Vaginal delivery       Past Medical History:   Diagnosis Date     Learning disability        Past Surgical History:   Procedure Laterality Date     NO HISTORY OF SURGERY         Family History   Problem Relation Age of Onset     Diabetes Mother      Depression Mother      Asthma Mother      Thyroid Disease Mother   "    Diabetes Father      Breast Cancer Maternal Aunt         in late 30's early 40's     Breast Cancer Other 30        maternal cousin     Hypertension No family hx of      Cerebrovascular Disease No family hx of      Glaucoma No family hx of      Macular Degeneration No family hx of      Anxiety Disorder No family hx of      Bleeding Disorder No family hx of      Liver Disease No family hx of        Social History     Tobacco Use     Smoking status: Never Smoker     Smokeless tobacco: Never Used   Substance Use Topics     Alcohol use: No         Objective:    Vitals: /73   Ht 1.619 m (5' 3.75\")   Wt 89.4 kg (197 lb)   BMI 34.08 kg/m    BMI: Body mass index is 34.08 kg/m .  Height: 5' 3.75\"    Constitutional/ general:  Pt is in no apparent distress, appears well-nourished.  Cooperative with history and physical exam.     Psych:  The patient answered questions appropriately.  Normal affect.  Seems to have reasonable expectations, in terms of treatment.     Eyes:  Visual scanning/ tracking without deficit.     Ears:  Response to auditory stimuli is normal.  No hearing aid devices.  Auricles in proper alignment.     Lymphatic:  Popliteal lymph nodes not enlarged.     Lungs:  Non labored breathing, non labored speech. No cough.  No audible wheezing. Even, quiet breathing.       Vascular:  Pedal pulses are palpable bilaterally for both the DP and PT arteries.  CFT < 3 sec.  No edema.  Pedal hair growth noted.     Neuro:  Alert and oriented x 3. Coordinated gait.  Light touch sensation is intact to the L4, L5, S1 distributions. No obvious deficits.  No evidence of neurological-based weakness, spasticity, or contracture in the lower extremities.     Derm: Normal texture and turgor.  No erythema, ecchymosis, or cyanosis.  No open lesions.     Musculoskeletal:    Lower extremity muscle strength is normal.  Patient is ambulatory without an assistive device or brace.  No gross deformities.      Normal arch with " weightbearing.   ROM is within normal limits.  Negative tinel's sign.  No side to side compression pain of the calcaneus.  Pain upon palpation to the bilateral plantarmedial  of the calcaneus.  No erythema, edema, ecchymosis, or subcutaneous masses noted.  No pain on palpation or stressing any tendons.  Negative Tinel's sign          Assessment:  Plantar Fasciitis right and left foot     Plan:   Discussed etiology and treatment options with the patient.  The potential causes and nature of plantar fasciitis were discussed with the patient.  We reviewed the natural history/prognosis of the condition and risks if left untreated.  These include chronic pain, other sites of pain due to gait changes, and potential plantar fascial rupture.      We discussed possible causes of the condition as it relates to the patients specific situation.      Conservative treatment options were reviewed:  appropriate shoes, avoidance of barefoot walking, inserts/orthoses, stretching, ice, massage, immobilization and NSAIDs.     We also reviewed the options of injection therapy and surgery.  However, it was made clear that surgery is only considered when conservative therapy fails.  The risks and benefits of injection therapy, and surgery were discussed.  Dispensed handout.       After thorough discussion and answering all questions, the patient elected to modifying activities, supportive shoes, ice, stretching, and not going barefoot.  Good house shoes at all times and I made recommendations.  Patient will get good shoes for outside the house and I made recommendations.  Discussed over-the-counter arch supports.  For one week patient to take ibuprofen 600 mg tid after meals and will then stop.  Patient to stop medication if any GI upset or any other side effects.  We gave her heel cups today.   RTC in 4 weeks if not better otherwise prn.     Michael Boyce, GISELLE, FACFAS

## 2021-06-16 ENCOUNTER — OFFICE VISIT (OUTPATIENT)
Dept: PODIATRY | Facility: CLINIC | Age: 26
End: 2021-06-16
Payer: COMMERCIAL

## 2021-06-16 VITALS
DIASTOLIC BLOOD PRESSURE: 70 MMHG | HEART RATE: 85 BPM | BODY MASS INDEX: 34.08 KG/M2 | WEIGHT: 197 LBS | SYSTOLIC BLOOD PRESSURE: 110 MMHG

## 2021-06-16 DIAGNOSIS — M72.2 PLANTAR FASCIITIS: Primary | ICD-10-CM

## 2021-06-16 PROCEDURE — 99213 OFFICE O/P EST LOW 20 MIN: CPT | Performed by: PODIATRIST

## 2021-06-16 NOTE — LETTER
6/16/2021         RE: Birdie Ellis  [address]  [facility] [address]        Dear Colleague,    Thank you for referring your patient, Birdie Ellis, to the [facility]. Please see a copy of my visit note below.    Subjective:    4/28/21   Patient is seen today as a new pt self referral with a 7 month(s) hx of bilateral heel pain left>R.   Points to the plantarmedial calcaneal tubercle.  Most painful upon rising in a.m. or after prolonged sitting.  Aggravated by activity and relieved by rest.  Has tried changing shoewear, OTC inserts, without much success.  Denies erythema, edema, ecchymosis, numbness, loss of strength.  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    6/16/21 patient returns for bilateral plantar fasciitis.  She states they both hurt equally now.  Points to plantar central heel.  Aggravated by activity and relieved by rest.  She has been icing stretching and wearing good shoes at all times.  She tried plastic heel cups but they did not help.  Denies edema numbness or weakness    ROS:  See above        Allergies   Allergen Reactions     Pollen Extract        Current Outpatient Medications   Medication Sig Dispense Refill     clindamycin (CLEOCIN T) 1 % external solution USE AS A WASH TOPICALLY ON THE AFFECTED AREA ONCE DAILY       hydrOXYzine (VISTARIL) 25 MG capsule TAKE 1 TO 4 CAPSULES BY MOUTH THREE TIMES DAILY AS NEEDED FOR ANXIETY. DO NOT USE WITH ALPRAZOLAM       levonorgestrel (MIRENA) 20 MCG/24HR IUD 1 each by Intrauterine route       olopatadine (PAZEO) 0.7 % ophthalmic solution Apply 1 drop to eye daily as needed (for itchy eyes) 1 Bottle 11     terbinafine (LAMISIL) 250 MG tablet Take 1 tablet by mouth daily         Patient Active Problem List   Diagnosis     CARDIOVASCULAR SCREENING; LDL GOAL LESS THAN 160     Learning disability     Cold sore     Toenail deformity     Elevated ALT measurement     Atopic  dermatitis     Difficulty with family     Drug use affecting pregnancy in first trimester     Hyperemesis gravidarum     IUD (intrauterine device) in place     Obesity in pregnancy     Vaginal delivery       Past Medical History:   Diagnosis Date     Learning disability        Past Surgical History:   Procedure Laterality Date     NO HISTORY OF SURGERY         Family History   Problem Relation Age of Onset     Diabetes Mother      Depression Mother      Asthma Mother      Thyroid Disease Mother      Diabetes Father      Breast Cancer Maternal Aunt         in late 30's early 40's     Breast Cancer Other 30        maternal cousin     Hypertension No family hx of      Cerebrovascular Disease No family hx of      Glaucoma No family hx of      Macular Degeneration No family hx of      Anxiety Disorder No family hx of      Bleeding Disorder No family hx of      Liver Disease No family hx of        Social History     Tobacco Use     Smoking status: Never Smoker     Smokeless tobacco: Never Used   Substance Use Topics     Alcohol use: No         Objective:    Vitals: There were no vitals taken for this visit.  BMI: There is no height or weight on file to calculate BMI.  Height: Data Unavailable    Constitutional/ general:  Pt is in no apparent distress, appears well-nourished.  Cooperative with history and physical exam.     Psych:  The patient answered questions appropriately.  Normal affect.  Seems to have reasonable expectations, in terms of treatment.     Eyes:  Visual scanning/ tracking without deficit.     Ears:  Response to auditory stimuli is normal.  No hearing aid devices.  Auricles in proper alignment.     Lymphatic:  Popliteal lymph nodes not enlarged.     Lungs:  Non labored breathing, non labored speech. No cough.  No audible wheezing. Even, quiet breathing.       Vascular:  Pedal pulses are palpable bilaterally for both the DP and PT arteries.  CFT < 3 sec.  No edema.  Pedal hair growth noted.     Neuro:   Alert and oriented x 3. Coordinated gait.  Light touch sensation is intact to the L4, L5, S1 distributions. No obvious deficits.  No evidence of neurological-based weakness, spasticity, or contracture in the lower extremities.     Derm: Normal texture and turgor.  No erythema, ecchymosis, or cyanosis.  No open lesions.     Musculoskeletal:    Lower extremity muscle strength is normal.  Patient is ambulatory without an assistive device or brace.  No gross deformities.     Pronated arch with weightbearing.   ROM is within normal limits.  Negative tinel's sign.  No side to side compression pain of the calcaneus.  Pain upon palpation to the bilateral plantarmedial  of the calcaneus.  No erythema, edema, ecchymosis, or subcutaneous masses noted.  No pain on palpation or stressing any tendons.  Negative Tinel's sign          Assessment:  Plantar Fasciitis right and left foot     Plan:   Discussed etiology and treatment options with the patient.  The potential causes and nature of plantar fasciitis were discussed with the patient.  We reviewed the natural history/prognosis of the condition and risks if left untreated.  These include chronic pain, other sites of pain due to gait changes, and potential plantar fascial rupture.      We discussed possible causes of the condition as it relates to the patients specific situation.      Conservative treatment options were reviewed:  appropriate shoes, avoidance of barefoot walking, inserts/orthoses, stretching, ice, massage, immobilization and NSAIDs.     We also reviewed the options of injection therapy and surgery.  However, it was made clear that surgery is only considered when conservative therapy fails.  The risks and benefits of injection therapy, and surgery were discussed.  Dispensed handout.       After thorough discussion and answering all questions, the patient elected to start physical therapy and we wrote an order for this.  We wrote her a prescription for orthotics.   She will wear these in a good tennis shoe especially when she goes on walks every day.  RTC in 3 to 6 weeks if not better otherwise as needed    Michael Boyce DPM, FACFAS          Again, thank you for allowing me to participate in the care of your patient.        Sincerely,        Michael Boyce DPM

## 2021-06-16 NOTE — PROGRESS NOTES
Subjective:    4/28/21   Patient is seen today as a new pt self referral with a 7 month(s) hx of bilateral heel pain left>R.   Points to the plantarmedial calcaneal tubercle.  Most painful upon rising in a.m. or after prolonged sitting.  Aggravated by activity and relieved by rest.  Has tried changing shoewear, OTC inserts, without much success.  Denies erythema, edema, ecchymosis, numbness, loss of strength.  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    6/16/21 patient returns for bilateral plantar fasciitis.  She states they both hurt equally now.  Points to plantar central heel.  Aggravated by activity and relieved by rest.  She has been icing stretching and wearing good shoes at all times.  She tried plastic heel cups but they did not help.  Denies edema numbness or weakness    ROS:  See above        Allergies   Allergen Reactions     Pollen Extract        Current Outpatient Medications   Medication Sig Dispense Refill     clindamycin (CLEOCIN T) 1 % external solution USE AS A WASH TOPICALLY ON THE AFFECTED AREA ONCE DAILY       hydrOXYzine (VISTARIL) 25 MG capsule TAKE 1 TO 4 CAPSULES BY MOUTH THREE TIMES DAILY AS NEEDED FOR ANXIETY. DO NOT USE WITH ALPRAZOLAM       levonorgestrel (MIRENA) 20 MCG/24HR IUD 1 each by Intrauterine route       olopatadine (PAZEO) 0.7 % ophthalmic solution Apply 1 drop to eye daily as needed (for itchy eyes) 1 Bottle 11     terbinafine (LAMISIL) 250 MG tablet Take 1 tablet by mouth daily         Patient Active Problem List   Diagnosis     CARDIOVASCULAR SCREENING; LDL GOAL LESS THAN 160     Learning disability     Cold sore     Toenail deformity     Elevated ALT measurement     Atopic dermatitis     Difficulty with family     Drug use affecting pregnancy in first trimester     Hyperemesis gravidarum     IUD (intrauterine device) in place     Obesity in pregnancy     Vaginal delivery       Past Medical History:   Diagnosis Date     Learning disability        Past  Surgical History:   Procedure Laterality Date     NO HISTORY OF SURGERY         Family History   Problem Relation Age of Onset     Diabetes Mother      Depression Mother      Asthma Mother      Thyroid Disease Mother      Diabetes Father      Breast Cancer Maternal Aunt         in late 30's early 40's     Breast Cancer Other 30        maternal cousin     Hypertension No family hx of      Cerebrovascular Disease No family hx of      Glaucoma No family hx of      Macular Degeneration No family hx of      Anxiety Disorder No family hx of      Bleeding Disorder No family hx of      Liver Disease No family hx of        Social History     Tobacco Use     Smoking status: Never Smoker     Smokeless tobacco: Never Used   Substance Use Topics     Alcohol use: No         Objective:    Vitals: There were no vitals taken for this visit.  BMI: There is no height or weight on file to calculate BMI.  Height: Data Unavailable    Constitutional/ general:  Pt is in no apparent distress, appears well-nourished.  Cooperative with history and physical exam.     Psych:  The patient answered questions appropriately.  Normal affect.  Seems to have reasonable expectations, in terms of treatment.     Eyes:  Visual scanning/ tracking without deficit.     Ears:  Response to auditory stimuli is normal.  No hearing aid devices.  Auricles in proper alignment.     Lymphatic:  Popliteal lymph nodes not enlarged.     Lungs:  Non labored breathing, non labored speech. No cough.  No audible wheezing. Even, quiet breathing.       Vascular:  Pedal pulses are palpable bilaterally for both the DP and PT arteries.  CFT < 3 sec.  No edema.  Pedal hair growth noted.     Neuro:  Alert and oriented x 3. Coordinated gait.  Light touch sensation is intact to the L4, L5, S1 distributions. No obvious deficits.  No evidence of neurological-based weakness, spasticity, or contracture in the lower extremities.     Derm: Normal texture and turgor.  No erythema,  ecchymosis, or cyanosis.  No open lesions.     Musculoskeletal:    Lower extremity muscle strength is normal.  Patient is ambulatory without an assistive device or brace.  No gross deformities.     Pronated arch with weightbearing.   ROM is within normal limits.  Negative tinel's sign.  No side to side compression pain of the calcaneus.  Pain upon palpation to the bilateral plantarmedial  of the calcaneus.  No erythema, edema, ecchymosis, or subcutaneous masses noted.  No pain on palpation or stressing any tendons.  Negative Tinel's sign          Assessment:  Plantar Fasciitis right and left foot     Plan:   Discussed etiology and treatment options with the patient.  The potential causes and nature of plantar fasciitis were discussed with the patient.  We reviewed the natural history/prognosis of the condition and risks if left untreated.  These include chronic pain, other sites of pain due to gait changes, and potential plantar fascial rupture.      We discussed possible causes of the condition as it relates to the patients specific situation.      Conservative treatment options were reviewed:  appropriate shoes, avoidance of barefoot walking, inserts/orthoses, stretching, ice, massage, immobilization and NSAIDs.     We also reviewed the options of injection therapy and surgery.  However, it was made clear that surgery is only considered when conservative therapy fails.  The risks and benefits of injection therapy, and surgery were discussed.  Dispensed handout.       After thorough discussion and answering all questions, the patient elected to start physical therapy and we wrote an order for this.  We wrote her a prescription for orthotics.  She will wear these in a good tennis shoe especially when she goes on walks every day.  RTC in 3 to 6 weeks if not better otherwise as needed    Michael Boyce DPM, FACFAS

## 2021-06-16 NOTE — PATIENT INSTRUCTIONS
We wish you continued good healing. If you have any questions or concerns, please do not hesitate to contact us at [phone]    [employer] (secure e-mail communication and access to your chart) to send a message or to make an appointment.    Please remember to call and schedule a follow up appointment if one was recommended at your earliest convenience.     +++OF MARCH 2020+++ LOCATION AND HOURS HAVE CHANGED    PLEASE CALL CLINICS TO VERIFY DAYS AND TIMES  PODIATRY CLINIC HOURS  TELEPHONE NUMBER    Dr. Michael KRUGERPANA [facility]        Clinics:  [facility]   Tuesday 1PM-6PM  [place]  Wednesday 745AM-330PM  [place]  Thursday/Friday 745AM-230PM  [facility] APPOINTMENTS  [phone]    [place] APPOINTMENTS  [phone]          If you need a medication refill, please contact us you may need lab work and/or a follow up visit prior to your refill (i.e. Antifungal medications).    If MRI needed please call Imaging at [phone] or [phone]    HOW DO I GET MY KNEE SCOOTER? Knee scooters can be picked up at ANY Medical Supply stores with your knee scooter Prescription.  OR    Bring your signed prescription to an [facility] showroom.

## 2021-06-28 ENCOUNTER — THERAPY VISIT (OUTPATIENT)
Dept: PHYSICAL THERAPY | Facility: CLINIC | Age: 26
End: 2021-06-28
Payer: COMMERCIAL

## 2021-06-28 DIAGNOSIS — M72.2 PLANTAR FASCIITIS: ICD-10-CM

## 2021-06-28 PROCEDURE — 97161 PT EVAL LOW COMPLEX 20 MIN: CPT | Mod: GP | Performed by: PHYSICAL THERAPIST

## 2021-06-28 PROCEDURE — 97110 THERAPEUTIC EXERCISES: CPT | Mod: GP | Performed by: PHYSICAL THERAPIST

## 2021-06-28 NOTE — PROGRESS NOTES
"Physical Therapy Initial Evaluation  Subjective:  The history is provided by the patient. No  was used.   Patient Health History  Birdie Ellis being seen for B heel pain.     Date of Onset: Approximately 9 months ago.   Problem occurred: Unknown   Pain score: up to 10/10.  General health as reported by patient is good.  Pertinent medical history includes: none.   Red flags:  None as reported by patient.  Medical allergies: other. Other medical allergies details: Pollen extract.   Surgeries include:  None.    Current medications:  None.    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                     Therapist Generated HPI Evaluation  Problem details: Patient reports onset of B heel pain approximately 9 months ago.  Has tried good shoes, but thinks pain has actually increased.  Has been doing stretches as prescribed by physician and finds these painful.  Has an appointment with orthotics in the future.  Received PT orders 6/16/21..         Type of problem:  Bilateral feet.    This is a chronic condition.  Condition occurred with:  Insidious onset.  Where condition occurred: at home.  Patient reports pain:  Other (calcaneus, bottom of heels).  Pain is described as other (\"putting pressure on a swollen area\") and is intermittent.  Pain radiates to:  No radiation. Pain is worse in the A.M..  Since onset symptoms are gradually worsening.  Exacerbated by: First steps after sleeping or sitting.  Is unable to carry children into house after driving them home from park.  and relieved by other (movement, some walking).                              Objective:  Standing Alignment:                Ankle/Foot:  Pes planus L and pes planus R              Ankle/Foot Evaluation  ROM:  PROM is normal.  AROM:    Dorsiflexion:  Left:   8  Right:   8  Plantarflexion:  Left:  68    Right:  65  Inversion:  Left:  28     Right:  28  Eversion:  22     Right:  20        Strength is " normal.      PALPATION:   Left ankle tenderness present at:  medial calcaneal  Right ankle tenderness present at:   medial calcaneal  EDEMA: normal                                                              General     ROS    Assessment/Plan:    Patient is a 25 year old female with B foot complaints.    Patient has the following significant findings with corresponding treatment plan.                Diagnosis 1:  Plantar fasciitis  Pain -  US, manual therapy and home program  Decreased ROM/flexibility - manual therapy, therapeutic exercise and home program  Decreased function - therapeutic activities and home program    Previous and current functional limitations:  (See Goal Flow Sheet for this information)    Short term and Long term goals: (See Goal Flow Sheet for this information)     Communication ability:  Patient appears to be able to clearly communicate and understand verbal and written communication and follow directions correctly.  Treatment Explanation - The following has been discussed with the patient:   RX ordered/plan of care  Anticipated outcomes  Possible risks and side effects  This patient would benefit from PT intervention to resume normal activities.   Rehab potential is good.    Frequency:  1 X week, once daily  Duration:  for 8 weeks  Discharge Plan:  Achieve all LTG.  Independent in home treatment program.  Reach maximal therapeutic benefit.    Please refer to the daily flowsheet for treatment today, total treatment time and time spent performing 1:1 timed codes.

## 2021-07-14 ENCOUNTER — THERAPY VISIT (OUTPATIENT)
Dept: PHYSICAL THERAPY | Facility: CLINIC | Age: 26
End: 2021-07-14
Payer: COMMERCIAL

## 2021-07-14 DIAGNOSIS — M72.2 PLANTAR FASCIITIS: ICD-10-CM

## 2021-07-14 PROCEDURE — 97140 MANUAL THERAPY 1/> REGIONS: CPT | Mod: GP | Performed by: PHYSICAL THERAPIST

## 2021-07-14 PROCEDURE — 97035 APP MDLTY 1+ULTRASOUND EA 15: CPT | Mod: GP | Performed by: PHYSICAL THERAPIST

## 2021-09-30 PROBLEM — M72.2 PLANTAR FASCIITIS: Status: RESOLVED | Noted: 2021-06-28 | Resolved: 2021-09-30

## 2021-09-30 NOTE — PROGRESS NOTES
Subjective:  HPI  Physical Exam                    Objective:  System    Physical Exam    General     ROS    Assessment/Plan:    DISCHARGE REPORT    Progress reporting period is from 6/28/2021 to 7/14/2021.     SUBJECTIVE  Subjective: Purchased new shoes which seemed to have helped pain.           Changes in function: Yes, see goal flow sheet for change in function (pain decreased)   Adverse reactions: None;   ,     Patient has failed to return to therapy so current objective findings are unknown.  The subjective and objective information are from the last SOAP note on this patient.    OBJECTIVE  Objective: Minimal tenderness in PF or subcalcaneal      ASSESSMENT/PLAN  Updated problem list and treatment plan:Diagnosis 1:  Plantar fasciitis  Pain -  US, manual therapy and home program  Decreased ROM/flexibility - manual therapy, therapeutic exercise and home program  Decreased function - therapeutic activities and home program   STG/LTGs have been met or progress has been made towards goals:  unknown  Assessment of Progress: The patient has not returned to therapy. Current status is unknown.  Self Management Plans:  Patient has been instructed in a home treatment program.    Birdie continues to require the following intervention to meet STG and LTG's: PT intervention is no longer required to meet STG/LTG.  The patient failed to complete scheduled/ordered appointments so current information is unknown.  We will discharge this patient from PT.    Recommendations:  Failed last visit and did not reschedule.  Discharge pt from PT.      Please refer to the daily flowsheet for treatment today, total treatment time and time spent performing 1:1 timed codes.

## 2021-10-27 ENCOUNTER — APPOINTMENT (OUTPATIENT)
Dept: OPTOMETRY | Facility: CLINIC | Age: 26
End: 2021-10-27
Payer: COMMERCIAL

## 2021-10-27 PROCEDURE — 92340 FIT SPECTACLES MONOFOCAL: CPT | Performed by: OPTOMETRIST

## 2022-02-22 ENCOUNTER — OFFICE VISIT (OUTPATIENT)
Dept: OPTOMETRY | Facility: CLINIC | Age: 27
End: 2022-02-22
Payer: COMMERCIAL

## 2022-02-22 DIAGNOSIS — H04.123 DRY EYE SYNDROME OF BOTH EYES: ICD-10-CM

## 2022-02-22 DIAGNOSIS — H52.13 MYOPIA OF BOTH EYES: ICD-10-CM

## 2022-02-22 DIAGNOSIS — H52.223 REGULAR ASTIGMATISM OF BOTH EYES: ICD-10-CM

## 2022-02-22 DIAGNOSIS — Z01.00 EXAMINATION OF EYES AND VISION: Primary | ICD-10-CM

## 2022-02-22 DIAGNOSIS — H10.13 ALLERGIC CONJUNCTIVITIS OF BOTH EYES: ICD-10-CM

## 2022-02-22 PROCEDURE — 92014 COMPRE OPH EXAM EST PT 1/>: CPT | Performed by: OPTOMETRIST

## 2022-02-22 PROCEDURE — 92015 DETERMINE REFRACTIVE STATE: CPT | Performed by: OPTOMETRIST

## 2022-02-22 RX ORDER — OLOPATADINE HYDROCHLORIDE 2 MG/ML
1 SOLUTION/ DROPS OPHTHALMIC DAILY
Qty: 2.5 ML | Refills: 11 | Status: SHIPPED | OUTPATIENT
Start: 2022-02-22 | End: 2022-09-20

## 2022-02-22 RX ORDER — POLYETHYLENE GLYCOL 400 AND PROPYLENE GLYCOL 4; 3 MG/ML; MG/ML
1 SOLUTION/ DROPS OPHTHALMIC 4 TIMES DAILY
Qty: 6 ML | Refills: 12 | Status: SHIPPED | OUTPATIENT
Start: 2022-02-22 | End: 2022-09-20

## 2022-02-22 ASSESSMENT — REFRACTION_WEARINGRX
SPECS_TYPE: SVL
OS_CYLINDER: +1.75
OD_SPHERE: -3.00
OS_SPHERE: -3.75
OD_AXIS: 091
OS_AXIS: 088
OD_CYLINDER: +1.00

## 2022-02-22 ASSESSMENT — VISUAL ACUITY
OD_CC: 20/20
OS_CC: 20/20
METHOD: SNELLEN - LINEAR
OD_CC+: -1
OD_SC+: -1
OS_SC: 20/125
OS_CC: 20/20
OD_SC: 20/100
OD_CC: 20/20
CORRECTION_TYPE: GLASSES

## 2022-02-22 ASSESSMENT — REFRACTION_MANIFEST
OD_AXIS: 091
OD_CYLINDER: +1.50
OS_CYLINDER: +1.75
OS_AXIS: 088
OD_SPHERE: -3.00
OS_SPHERE: -3.75

## 2022-02-22 ASSESSMENT — TONOMETRY
OS_IOP_MMHG: 16
OD_IOP_MMHG: 20
IOP_METHOD: TONOPEN

## 2022-02-22 ASSESSMENT — SLIT LAMP EXAM - LIDS
COMMENTS: FALSE EYELASHES UPPER LID
COMMENTS: FALSE EYELASHES UPPER LID

## 2022-02-22 ASSESSMENT — CONF VISUAL FIELD
OS_NORMAL: 1
OD_NORMAL: 1

## 2022-02-22 ASSESSMENT — KERATOMETRY
OD_AXISANGLE2_DEGREES: 175
OD_K2POWER_DIOPTERS: 44.25
OS_AXISANGLE2_DEGREES: 175
OS_AXISANGLE_DEGREES: 085
OS_K1POWER_DIOPTERS: 42.00
OS_K2POWER_DIOPTERS: 44.50
OD_AXISANGLE_DEGREES: 085
OD_K1POWER_DIOPTERS: 41.75

## 2022-02-22 ASSESSMENT — CUP TO DISC RATIO
OD_RATIO: 0.2
OS_RATIO: 0.2

## 2022-02-22 ASSESSMENT — EXTERNAL EXAM - RIGHT EYE: OD_EXAM: NORMAL

## 2022-02-22 ASSESSMENT — EXTERNAL EXAM - LEFT EYE: OS_EXAM: NORMAL

## 2022-02-22 NOTE — PATIENT INSTRUCTIONS
Eyeglass prescription given.     Pataday to be used once daily for itchy eyes.  Use as needed.    Systane Ultra 1 drop both eyes 2-4 x day.    Return in 1 year for a complete eye exam or sooner if needed.    Roger Polanco, ANGEL    The affects of the dilating drops last for 4- 6 hours.  You will be more sensitive to light and vision will be blurry up close.  Do not drive if you do not feel comfortable.  Mydriatic sunglasses were given if needed.    The adhesive used with false eyelashes may be toxic to the eyes.  Optometry Providers       Clinic Locations                                 Telephone Number   Dr. Selena [facility] [phone]     [facility] Hours:                [facility] Hours:       [facility] Hours:   [address] [facility] [address]   [address] [facility]     [address] [facility]  [place] [address]   [place] [address]    [place] [address]  Phone: [phone]                    Phone: [phone]     Phone: [phone]                      Monday 8:00-6:00                          Monday 8:00-6:00                          Monday 8:00-6:00              Tuesday 8:00-6:00                          Tuesday 8:00-6:00                          Tuesday 8:00-6:00              Wednesday 8:00-6:00                  Wednesday 8:00-6:00                   Wednesday 8:00-6:00      Thursday 8:00-6:00                        Thursday 8:00-6:00                         Thursday 8:00-6:00            Friday 8:00-5:00                              Friday 8:00-5:00                              Friday 8:00-5:00    [facility] Hours:   [address] [facility] [place] [address] [phone]    Monday 9:00-6:00  Tuesday 9:00-6:00  Wednesday 9:00-6:00  Thursday 9:00-6:00  Friday 9:00-5:00  Please log on to [email or web address] to order your contact lenses.  The link is found on the Eye Care  and Vision Services page.  As always, Thank you for trusting us with your health care needs!

## 2022-02-22 NOTE — LETTER
2/22/2022         RE: Birdie Ellis  [address]  [place] [address]        Dear Colleague,    Thank you for referring your patient, Birdie Ellis, to the [facility]. Please see a copy of my visit note below.    Chief Complaint   Patient presents with     Annual Eye Exam      Accompanied by self  Last Eye Exam: 10-  Dilated Previously: Yes    What are you currently using to see?  glasses       Distance Vision Acuity: Noticed gradual change in both eyes    Near Vision Acuity: Satisfied with vision while reading  with glasses    Eye Comfort: dry  Do you use eye drops? 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          Medical, surgical and family histories reviewed and updated 2/22/2022.       OBJECTIVE: See Ophthalmology exam    ASSESSMENT:    ICD-10-CM    1. Examination of eyes and vision  Z01.00 EYE EXAM (SIMPLE-NONBILLABLE)   2. Myopia of both eyes  H52.13 REFRACTION   3. Regular astigmatism of both eyes  H52.223 REFRACTION   4. Allergic conjunctivitis of both eyes  H10.13 EYE EXAM (SIMPLE-NONBILLABLE)     olopatadine (PATADAY) 0.2 % ophthalmic solution   5. Dry eye syndrome of both eyes  H04.123 EYE EXAM (SIMPLE-NONBILLABLE)     polyethylene glycol-propylene glycol (SYSTANE ULTRA) 0.4-0.3 % SOLN ophthalmic solution      PLAN:     Patient Instructions   Eyeglass prescription given.     Pataday to be used once daily for itchy eyes.  Use as needed.    Systane Ultra 1 drop both eyes 2-4 x day.    Return in 1 year for a complete eye exam or sooner if needed.    Roger Polanco, OD           Again, thank you for allowing me to participate in the care of your patient.        Sincerely,        Roger Polanco, OD

## 2022-02-22 NOTE — PROGRESS NOTES
Chief Complaint   Patient presents with     Annual Eye Exam      Accompanied by self  Last Eye Exam: 10-  Dilated Previously: Yes    What are you currently using to see?  glasses       Distance Vision Acuity: Noticed gradual change in both eyes    Near Vision Acuity: Satisfied with vision while reading  with glasses    Eye Comfort: dry  Do you use eye drops? 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          Medical, surgical and family histories reviewed and updated 2/22/2022.       OBJECTIVE: See Ophthalmology exam    ASSESSMENT:    ICD-10-CM    1. Examination of eyes and vision  Z01.00 EYE EXAM (SIMPLE-NONBILLABLE)   2. Myopia of both eyes  H52.13 REFRACTION   3. Regular astigmatism of both eyes  H52.223 REFRACTION   4. Allergic conjunctivitis of both eyes  H10.13 EYE EXAM (SIMPLE-NONBILLABLE)     olopatadine (PATADAY) 0.2 % ophthalmic solution   5. Dry eye syndrome of both eyes  H04.123 EYE EXAM (SIMPLE-NONBILLABLE)     polyethylene glycol-propylene glycol (SYSTANE ULTRA) 0.4-0.3 % SOLN ophthalmic solution      PLAN:     Patient Instructions   Eyeglass prescription given.     Pataday to be used once daily for itchy eyes.  Use as needed.    Systane Ultra 1 drop both eyes 2-4 x day.    Return in 1 year for a complete eye exam or sooner if needed.    Roger Polanco, OD

## 2022-02-23 ENCOUNTER — OFFICE VISIT (OUTPATIENT)
Dept: FAMILY MEDICINE | Facility: CLINIC | Age: 27
End: 2022-02-23
Payer: COMMERCIAL

## 2022-02-23 VITALS
TEMPERATURE: 97.2 F | HEART RATE: 97 BPM | BODY MASS INDEX: 33.46 KG/M2 | HEIGHT: 64 IN | WEIGHT: 196 LBS | OXYGEN SATURATION: 100 % | RESPIRATION RATE: 18 BRPM | DIASTOLIC BLOOD PRESSURE: 79 MMHG | SYSTOLIC BLOOD PRESSURE: 120 MMHG

## 2022-02-23 DIAGNOSIS — L73.9 FOLLICULITIS: ICD-10-CM

## 2022-02-23 DIAGNOSIS — L70.0 ACNE VULGARIS: Primary | ICD-10-CM

## 2022-02-23 DIAGNOSIS — B35.1 ONYCHOMYCOSIS: ICD-10-CM

## 2022-02-23 LAB
ALBUMIN SERPL-MCNC: 3.5 G/DL (ref 3.4–5)
ALP SERPL-CCNC: 101 U/L (ref 40–150)
ALT SERPL W P-5'-P-CCNC: 29 U/L (ref 0–50)
ANION GAP SERPL CALCULATED.3IONS-SCNC: 4 MMOL/L (ref 3–14)
AST SERPL W P-5'-P-CCNC: 22 U/L (ref 0–45)
BILIRUB SERPL-MCNC: 0.4 MG/DL (ref 0.2–1.3)
BUN SERPL-MCNC: 14 MG/DL (ref 7–30)
CALCIUM SERPL-MCNC: 8.7 MG/DL (ref 8.5–10.1)
CHLORIDE BLD-SCNC: 108 MMOL/L (ref 94–109)
CO2 SERPL-SCNC: 24 MMOL/L (ref 20–32)
CREAT SERPL-MCNC: 0.66 MG/DL (ref 0.52–1.04)
GFR SERPL CREATININE-BSD FRML MDRD: >90 ML/MIN/1.73M2
GLUCOSE BLD-MCNC: 91 MG/DL (ref 70–99)
POTASSIUM BLD-SCNC: 4 MMOL/L (ref 3.4–5.3)
PROT SERPL-MCNC: 7.7 G/DL (ref 6.8–8.8)
SODIUM SERPL-SCNC: 136 MMOL/L (ref 133–144)

## 2022-02-23 PROCEDURE — 99214 OFFICE O/P EST MOD 30 MIN: CPT | Performed by: NURSE PRACTITIONER

## 2022-02-23 PROCEDURE — 36415 COLL VENOUS BLD VENIPUNCTURE: CPT | Performed by: NURSE PRACTITIONER

## 2022-02-23 PROCEDURE — 80053 COMPREHEN METABOLIC PANEL: CPT | Performed by: NURSE PRACTITIONER

## 2022-02-23 RX ORDER — ONDANSETRON 4 MG/1
4 TABLET, ORALLY DISINTEGRATING ORAL
COMMUNITY
Start: 2021-12-28 | End: 2022-02-23

## 2022-02-23 RX ORDER — METHOCARBAMOL 500 MG/1
500 TABLET, FILM COATED ORAL
COMMUNITY
Start: 2021-10-03 | End: 2022-02-23

## 2022-02-23 RX ORDER — SENNOSIDES A AND B 8.6 MG/1
1-2 TABLET, FILM COATED ORAL
COMMUNITY
Start: 2020-09-19 | End: 2022-02-23

## 2022-02-23 RX ORDER — FERROUS SULFATE 325(65) MG
325 TABLET ORAL
COMMUNITY
End: 2022-02-23

## 2022-02-23 RX ORDER — MUPIROCIN 20 MG/G
OINTMENT TOPICAL 3 TIMES DAILY
Qty: 30 G | Refills: 1 | Status: SHIPPED | OUTPATIENT
Start: 2022-02-23 | End: 2022-03-14

## 2022-02-23 RX ORDER — HYDROXYZINE PAMOATE 25 MG/1
25-100 CAPSULE ORAL
COMMUNITY
Start: 2021-05-24 | End: 2022-02-23

## 2022-02-23 ASSESSMENT — PAIN SCALES - GENERAL: PAINLEVEL: NO PAIN (0)

## 2022-02-23 NOTE — PATIENT INSTRUCTIONS
For folliculitis:  Can use OTC acne body wash with benzoyl peroxide once daily then use the mupirocin (3 times daily)      Patient Education    At [facility], we strive to deliver an exceptional experience to you, every time we see you. If you receive a survey, please complete it as we do value your feedback.  If you have MyChart, you can expect to receive results automatically within 24 hours of their completion.  Your provider will send a note interpreting your results as well.   If you do not have MyChart, you should receive your results in about a week by mail.    Your care team:                            Family Medicine Internal Medicine   MD Denzel Crump, MD Denice Reaves, MD Fady Manrique, MD Aaliyah Perez, PAFABY Acuna, APRHERIBERTO Barkley MD Pediatrics   Joselito Nichole, PALisaC  Elizabeth Michaels, DEEPTI Garcia, MD Lynn Alatorre APRN CNP   MD Simran Cherry MD Deborah Mielke, MD Nesha Eugene, APRN [facility]      Clinic hours: Monday - Thursday 7 am-6 pm; Fridays 7 am-5 pm.   Urgent care: Monday - Friday 10 am- 8 pm; Saturday and Sunday 9 am-5 pm.    Clinic: [phone]       [facility]: Monday - Thursday 8 am - 7 pm; Friday 8 am - 6 pm  [facility]: [phone]     Use [email or web address] for 24/7 diagnosis and treatment of dozens of conditions.    Patient Education     Folliculitis  Folliculitis is an infection of a hair follicle. A hair follicle is the little pocket where a hair grows out of the skin. Bacteria normally live on the skin. But sometimes bacteria can get trapped in a follicle and cause infection. This causes a bumpy rash. The area over the follicles is red and raised. It may itch or be painful. The bumps may have fluid (pus) inside. The pus may leak and then form crusts. Sores can spread to other areas of the body. Once it goes away, folliculitis  can come back at any time. Severe cases may cause lasting (permanent) hair loss and scarring.   Folliculitis can happen anywhere on the body where hair grows. It can be caused by rubbing from tight clothing. Ingrown hairs can cause it. Soaking in a hot tub or swimming pool that has bacteria in the water can cause it. It may also occur if a hair follicle is blocked by a bandage.   Sores often go away in a few days with no treatment. In some cases, medicine may be given. A small piece of skin or pus may be taken to find the type of bacteria causing the infection.   Home care  The healthcare provider may prescribe an antibiotic cream or ointment. Antibiotics taken by mouth (oral) may also be prescribed. Or you may be told to use an over-the-counter antibiotic cream. Follow all instructions when using any of these medicines.   General care    Apply warm, moist compresses to the sores for 20 minutes up to 3 times a day. You can make a compress by soaking a cloth in warm water. Squeeze out excess water.    Don t cut, poke, or squeeze the sores. This can be painful and spread infection.    Don t scratch the affected area. Scratching can delay healing.    Don t shave the areas affected by folliculitis.    If the sores leak fluid, cover the area with a nonstick gauze bandage. Use as little tape as possible. Carefully get rid of all soiled bandages.    Dress in loose cotton clothing.    Change sheets and blankets if they are soiled by pus. Wash all clothes, towels, sheets, and cloth diapers in soap and hot water. Don't share clothes, towels, or sheets with other family members.    Don't soak the sores in bath water. This can spread infection. Instead keep the area clean by gently washing sores with soap and warm water.    Wash your hands or use antibacterial gels often to prevent spreading the bacteria.    Follow-up care  Follow up with your healthcare provider, or as advised.  When to get medical advice  Call your healthcare  provider right away if any of these occur:    Fever of 100.4 F (38 C) or higher, or as directed by your provider    Rash spreads    Rash does not get better with treatment    Redness or swelling that gets worse    Rash becomes more painful    Foul-smelling fluid leaking from the skin    Rash improves, but then comes back   [employer] last reviewed this educational content on 8/1/2019 2000-2021 [employer]. All rights reserved. This information is not intended as a substitute for professional medical care. Always follow your healthcare professional's instructions.

## 2022-02-23 NOTE — PROGRESS NOTES
"  Assessment & Plan     Acne vulgaris  Patient feels r/t IUD. Discussed different options with her including switching from Mirena to Kyleena to see if that helps with smaller dose of progesterone. She is adamant she doesn't want more children right now (has 2 at home) and is most interested in this option as she had trouble remembering to take OCP. I discussed with colleague, Elizabeth Michaels, who recommended trying the switch and will replace for her - she will be scheduled with Elizabeth for the procedure.    Folliculitis  Use benzoyl peroxide wash daily and mupirocin ointment TID x7 days. If not resolved could add PO.  - mupirocin (BACTROBAN) 2 % external ointment; Apply topically 3 times daily for 7 days    Onychomycosis  Will check LFTs. If continues to be elevated she will need to do topical. If LFTs WNL will do terbinafine but will need LFTs rechecked during treatment.   - Comprehensive metabolic panel (BMP + Alb, Alk Phos, ALT, AST, Total. Bili, TP); Future         BMI:   Estimated body mass index is 33.91 kg/m  as calculated from the following:    Height as of this encounter: 1.619 m (5' 3.75\").    Weight as of this encounter: 88.9 kg (196 lb).       See Patient Instructions    Return in about 4 weeks (around 3/23/2022), or if symptoms worsen or fail to improve.     The benefits, risks and potential side effects were discussed in detail. Black box warnings discussed as relevant. All patient questions were answered. The patient was instructed to follow up immediately if any adverse reactions develop.    Return precautions discussed, including when to seek urgent/emergent care.    Patient verbalizes understanding and agrees with plan of care. Patient stable for discharge.      JENNYFER Schwartz CNP  [facility]    Robb Packer is a 26 year old who presents for the following health issues     History of Present Illness     Reason for visit:  Get iud taken out other questions as " "well  Symptom onset:  More than a month  Symptoms include:  Breaking out a lot cramping  Symptom intensity:  Moderate  Symptom progression:  Worsening  Had these symptoms before:  No  What makes it worse:  Noo  What makes it better:  No    She eats 2-3 servings of fruits and vegetables daily.She consumes 1 sweetened beverage(s) daily.She exercises with enough effort to increase her heart rate 60 or more minutes per day.  She exercises with enough effort to increase her heart rate 5 days per week.   She is taking medications regularly.         Has had IUD x1 year  Has been having body acne x1 month or so  Had a cyst that had to be lanced  Genital lesions - not painful - itchy. Has had this before.     Requesting refill of terbinafine. Last time LFT was a little elevated and then she found out she was pregnant so never took.  Didn't feel like topical was all that helpful.          Please abstract the following data from this visit with this patient into the appropriate field in Epic:    Tests that can be patient reported without a hard copy:  Pap smear done on this date: 11/19/20 (approximately), by this group: [facility], results were normal.   Other Tests found in the patient's chart through Chart Review/Care Everywhere:            Review of Systems   Constitutional, HEENT, cardiovascular, pulmonary, GI, , musculoskeletal, neuro, skin, endocrine and psych systems are negative, except as otherwise noted.      Objective    /79 (BP Location: Left arm, Patient Position: Sitting, Cuff Size: Adult Regular)   Pulse 97   Temp 97.2  F (36.2  C) (Tympanic)   Resp 18   Ht 1.619 m (5' 3.75\")   Wt 88.9 kg (196 lb)   LMP  (LMP Unknown)   SpO2 100%   BMI 33.91 kg/m    Body mass index is 33.91 kg/m .  Physical Exam   GENERAL: healthy, alert and no distress  RESP: lungs clear to auscultation - no rales, rhonchi or wheezes  CV: regular rate and rhythm, normal S1 S2, no S3 or S4, no murmur, click or rub, no " peripheral edema and peripheral pulses strong  MS: no gross musculoskeletal defects noted, no edema  SKIN: papular lesions to neck, chest, upper back. She also has erythema and swelling to pubic hair consistent with folliculitis. Great toenail with onychomycosis   PSYCH: mentation appears normal, affect normal/bright

## 2022-02-23 NOTE — Clinical Note
Please abstract the following data from this visit with this patient into the appropriate field in Epic:    Tests that can be patient reported without a hard copy:    Pap smear done on this date: 11/19/20 (approximately), by this group: [facility], results were normal.     Other Tests found in the patient's chart through Chart Review/Care Everywhere:        Note to Abstraction: If this section is blank, no results were found via Chart Review/Care Everywhere.

## 2022-02-24 DIAGNOSIS — B35.1 ONYCHOMYCOSIS: Primary | ICD-10-CM

## 2022-02-24 RX ORDER — TERBINAFINE HYDROCHLORIDE 250 MG/1
250 TABLET ORAL DAILY
Qty: 90 TABLET | Refills: 0 | Status: SHIPPED | OUTPATIENT
Start: 2022-02-24 | End: 2022-05-25

## 2022-03-04 ENCOUNTER — APPOINTMENT (OUTPATIENT)
Dept: OPTOMETRY | Facility: CLINIC | Age: 27
End: 2022-03-04
Payer: COMMERCIAL

## 2022-03-04 PROCEDURE — 92340 FIT SPECTACLES MONOFOCAL: CPT | Performed by: OPTOMETRIST

## 2022-03-14 ENCOUNTER — OFFICE VISIT (OUTPATIENT)
Dept: FAMILY MEDICINE | Facility: CLINIC | Age: 27
End: 2022-03-14
Payer: COMMERCIAL

## 2022-03-14 VITALS
OXYGEN SATURATION: 99 % | BODY MASS INDEX: 33.53 KG/M2 | DIASTOLIC BLOOD PRESSURE: 72 MMHG | TEMPERATURE: 98.7 F | HEART RATE: 87 BPM | RESPIRATION RATE: 18 BRPM | SYSTOLIC BLOOD PRESSURE: 113 MMHG | WEIGHT: 193.8 LBS

## 2022-03-14 DIAGNOSIS — N76.0 BACTERIAL VAGINOSIS: Primary | ICD-10-CM

## 2022-03-14 DIAGNOSIS — Z30.433 ENCOUNTER FOR REMOVAL AND REINSERTION OF INTRAUTERINE CONTRACEPTIVE DEVICE (IUD): Primary | ICD-10-CM

## 2022-03-14 DIAGNOSIS — R10.2 PELVIC CRAMPING: ICD-10-CM

## 2022-03-14 DIAGNOSIS — L73.9 FOLLICULITIS: ICD-10-CM

## 2022-03-14 DIAGNOSIS — B96.89 BACTERIAL VAGINOSIS: Primary | ICD-10-CM

## 2022-03-14 LAB
CLUE CELLS: PRESENT
TRICHOMONAS, WET PREP: ABNORMAL
WBC'S/HIGH POWER FIELD, WET PREP: ABNORMAL
YEAST, WET PREP: ABNORMAL

## 2022-03-14 PROCEDURE — 58301 REMOVE INTRAUTERINE DEVICE: CPT | Performed by: NURSE PRACTITIONER

## 2022-03-14 PROCEDURE — 99213 OFFICE O/P EST LOW 20 MIN: CPT | Mod: 25 | Performed by: NURSE PRACTITIONER

## 2022-03-14 PROCEDURE — 87491 CHLMYD TRACH DNA AMP PROBE: CPT | Performed by: NURSE PRACTITIONER

## 2022-03-14 PROCEDURE — 87210 SMEAR WET MOUNT SALINE/INK: CPT | Performed by: NURSE PRACTITIONER

## 2022-03-14 PROCEDURE — 58300 INSERT INTRAUTERINE DEVICE: CPT | Performed by: NURSE PRACTITIONER

## 2022-03-14 PROCEDURE — 87591 N.GONORRHOEAE DNA AMP PROB: CPT | Performed by: NURSE PRACTITIONER

## 2022-03-14 RX ORDER — MUPIROCIN 20 MG/G
OINTMENT TOPICAL 3 TIMES DAILY
Qty: 30 G | Refills: 1 | Status: SHIPPED | OUTPATIENT
Start: 2022-03-14 | End: 2022-09-20

## 2022-03-14 RX ORDER — METRONIDAZOLE 500 MG/1
500 TABLET ORAL 2 TIMES DAILY
Qty: 14 TABLET | Refills: 0 | Status: SHIPPED | OUTPATIENT
Start: 2022-03-14 | End: 2022-03-21

## 2022-03-14 RX ORDER — LEVONORGESTREL 19.5 MG/1
1 INTRAUTERINE DEVICE INTRAUTERINE CONTINUOUS
Qty: 1 EACH | Refills: 0
Start: 2022-03-14 | End: 2023-10-18

## 2022-03-14 NOTE — PROCEDURES
The patient meets and is agreeable to the following conditions:  She is parous.  She is not interested in conception in the near future.  She currently is in a stable, monogamous relationship.  There is no previous history of pelvic inflammatory disease.  There is no previous history of ectopic pregnancy.  She is willing to check monthly for the IUD string.  She is at least 8 weeks post-partum.  There is no history of unresolved abnormal uterine bleeding.  There is no history of an unresolved abnormal PAP smear.  She has no history of Luis's disease or an allergy to copper (for ParaGard).  She has no history of diabetes, AIDS, leukemia, IV drug use or chronic steroid use.  She is willing to return annually for pelvic exams  She denies the possibility of pregnancy.      The following risks were discussed with the patient:  Possibility of pregnancy and ectopic pregnancy.  Possibility of pelvic inflammatory disease, particularly with new partners.  Risk of uterine perforation or IUD expulsion.  Possibility of difficult removal.  Spotting or heavy bleeding.  Cramping, pain or infection during or after insertion.    The patient was given patient information on the IUD and the patient education brochure from the .  The patient has given consent to proceed with placement of the IUD.  A written consent form is present in the chart.  She wishes to proceed.    PROCEDURE:  IUD Placement    Type of IUD: Kyleena     The patient was placed in a dorsal lithotomy potion and a bimanual exam was performed to determine the position of the uterus.  The speculum was placed, cervix was identified and cleaned with betadine three times. Previous IUD strings visualized and grasped with a ring forceps.  Gentle traction applied and previous Mirena IUD was removed without issue and intact.  A single tooth tenaculum was applied to the anterior lip of the cervix for stabilization.   The uterus was sounded to 7.5 cm and removed.  (Target sound depth is 6.5 cm to 8.5 cm.)   The IUD insertion device was inserted without difficulty into the uterus to manufacturers recommendationsunder sterile conditions to the sounding depth. The IUD string was then cut to 2.0 cm.    The patient tolerated this procedure without immediate complication and minimal bleeding.  The patient is to return or call immediately for any unexplained fever, abdominal or pelvic pain, excessive bleeding, possibility of pregnancy, foul-smelling discharge, sense that the IUD has been expelled.    IUD DEVICE:  LOT # [id number]   EXP Date 11/2023    ND:  Kurt:  55930-103-53    JENNYFER Franklin CNP

## 2022-03-14 NOTE — PROGRESS NOTES
"  Assessment & Plan     Encounter for removal and reinsertion of intrauterine contraceptive device (IUD)  See procedure note.   - levonorgestrel (KYLEENA) 19.5 MG IUD 19.5 mg  - levonorgestrel (KYLEENA) 19.5 MG IUD; 1 each (19.5 mg) by Intrauterine route continuous  - INSERTION INTRAUTERINE DEVICE    Pelvic cramping  - Wet prep - Clinic Collect  - NEISSERIA GONORRHOEA PCR  - CHLAMYDIA TRACHOMATIS PCR    Folliculitis  Refilled as working well.   - mupirocin (BACTROBAN) 2 % external ointment; Apply topically 3 times daily    Ordering of each unique test  Prescription drug management  20 minutes spent on the date of the encounter doing chart review, history and exam, documentation and further activities per the note       BMI:   Estimated body mass index is 33.53 kg/m  as calculated from the following:    Height as of 2/23/22: 1.619 m (5' 3.75\").    Weight as of this encounter: 87.9 kg (193 lb 12.8 oz).   Weight management plan: Discussed healthy diet and exercise guidelines    Patient Instructions   We placed an IUD (intrauterine device) today for birth control.  You may experience some cramping like a period for the next few days and spotting for the next month or so.    After that, if you have the Copper IUD, your period pattern should normalize though it may be heavier and the cramping stronger than normal.  This is normal and you can use ibuprofen over the counter to help with that.      Nothing in the vagina (tampons, intercourse, etc.) for the next 48 hours.    If you have the hormonal IUD (Mirena, Liletta, Trinity) your bleeding pattern will remain irregular but lessen over time.  The first month, you may spot more often.  Some women don't get their period at all, some will get it every so often, but it will not be a regular, monthly cycle like you may be used to.    I recommend using condoms for the next seven days to protect against pregnancy and always to protect against sexually transmitted infections.  "     The risk for expelling the IUD is highest in the first month, therefore, come back in 4 weeks for a string check.  You should also check for the strings regularly yourself.    Congratulations on choosing such an effective birth control method!  Please contact us at any time with questions or concerns!        Return in about 4 weeks (around 4/11/2022) for string check.    Elizabeth Michaels, JENNYFER CNP  [facility]    Robb Packer is a 26 year old who presents for the following health issues     HPI   Acne since Mirena.  Wants to see if improves with Kyleena.    Pelvic cramping as well. Evaluated by OB in May 2021.  Negative US.  Vaginal swab showed bacterial vaginosis.  Improved somewhat with treatment.           Review of Systems   Constitutional, HEENT, cardiovascular, pulmonary, GI, , musculoskeletal, neuro, skin, endocrine and psych systems are negative, except as otherwise noted.      Objective    /72 (BP Location: Left arm, Patient Position: Sitting, Cuff Size: Adult Regular)   Pulse 87   Temp 98.7  F (37.1  C) (Tympanic)   Resp 18   Wt 87.9 kg (193 lb 12.8 oz)   LMP  (LMP Unknown)   SpO2 99%   BMI 33.53 kg/m    Body mass index is 33.53 kg/m .  Physical Exam   GENERAL: healthy, alert and no distress   (female): normal female external genitalia, normal urethral meatus, vaginal mucosa, normal cervix/adnexa/uterus without masses or discharge  SKIN: no suspicious lesions or rashes

## 2022-03-14 NOTE — PATIENT INSTRUCTIONS
We placed an IUD (intrauterine device) today for birth control.  You may experience some cramping like a period for the next few days and spotting for the next month or so.    After that, if you have the Copper IUD, your period pattern should normalize though it may be heavier and the cramping stronger than normal.  This is normal and you can use ibuprofen over the counter to help with that.      Nothing in the vagina (tampons, intercourse, etc.) for the next 48 hours.    If you have the hormonal IUD (Mirena, Liletta, Trinity) your bleeding pattern will remain irregular but lessen over time.  The first month, you may spot more often.  Some women don't get their period at all, some will get it every so often, but it will not be a regular, monthly cycle like you may be used to.    I recommend using condoms for the next seven days to protect against pregnancy and always to protect against sexually transmitted infections.      The risk for expelling the IUD is highest in the first month, therefore, come back in 4 weeks for a string check.  You should also check for the strings regularly yourself.    Congratulations on choosing such an effective birth control method!  Please contact us at any time with questions or concerns!

## 2022-03-15 LAB
C TRACH DNA SPEC QL NAA+PROBE: NEGATIVE
N GONORRHOEA DNA SPEC QL NAA+PROBE: NEGATIVE

## 2022-03-28 ENCOUNTER — APPOINTMENT (OUTPATIENT)
Dept: OPTOMETRY | Facility: CLINIC | Age: 27
End: 2022-03-28
Payer: COMMERCIAL

## 2022-03-28 PROCEDURE — 92340 FIT SPECTACLES MONOFOCAL: CPT | Performed by: OPTOMETRIST

## 2022-09-20 ENCOUNTER — OFFICE VISIT (OUTPATIENT)
Dept: FAMILY MEDICINE | Facility: CLINIC | Age: 27
End: 2022-09-20
Payer: COMMERCIAL

## 2022-09-20 VITALS
HEART RATE: 94 BPM | RESPIRATION RATE: 20 BRPM | OXYGEN SATURATION: 99 % | BODY MASS INDEX: 32.35 KG/M2 | TEMPERATURE: 98.5 F | SYSTOLIC BLOOD PRESSURE: 126 MMHG | WEIGHT: 187 LBS | DIASTOLIC BLOOD PRESSURE: 68 MMHG

## 2022-09-20 DIAGNOSIS — J30.1 SEASONAL ALLERGIC RHINITIS DUE TO POLLEN: ICD-10-CM

## 2022-09-20 DIAGNOSIS — M54.2 NECK PAIN: ICD-10-CM

## 2022-09-20 DIAGNOSIS — R59.1 LA (LYMPHADENOPATHY): Primary | ICD-10-CM

## 2022-09-20 LAB
ALBUMIN SERPL-MCNC: 3.4 G/DL (ref 3.4–5)
ALP SERPL-CCNC: 96 U/L (ref 40–150)
ALT SERPL W P-5'-P-CCNC: 32 U/L (ref 0–50)
ANION GAP SERPL CALCULATED.3IONS-SCNC: 4 MMOL/L (ref 3–14)
AST SERPL W P-5'-P-CCNC: 20 U/L (ref 0–45)
BASOPHILS # BLD AUTO: 0 10E3/UL (ref 0–0.2)
BASOPHILS NFR BLD AUTO: 0 %
BILIRUB SERPL-MCNC: 0.4 MG/DL (ref 0.2–1.3)
BUN SERPL-MCNC: 8 MG/DL (ref 7–30)
CALCIUM SERPL-MCNC: 8.9 MG/DL (ref 8.5–10.1)
CHLORIDE BLD-SCNC: 108 MMOL/L (ref 94–109)
CO2 SERPL-SCNC: 27 MMOL/L (ref 20–32)
CREAT SERPL-MCNC: 0.87 MG/DL (ref 0.52–1.04)
EOSINOPHIL # BLD AUTO: 0.4 10E3/UL (ref 0–0.7)
EOSINOPHIL NFR BLD AUTO: 5 %
ERYTHROCYTE [DISTWIDTH] IN BLOOD BY AUTOMATED COUNT: 13.9 % (ref 10–15)
ERYTHROCYTE [SEDIMENTATION RATE] IN BLOOD BY WESTERGREN METHOD: 8 MM/HR (ref 0–20)
GFR SERPL CREATININE-BSD FRML MDRD: >90 ML/MIN/1.73M2
GLUCOSE BLD-MCNC: 122 MG/DL (ref 70–99)
HCT VFR BLD AUTO: 40.8 % (ref 35–47)
HGB BLD-MCNC: 13.2 G/DL (ref 11.7–15.7)
IMM GRANULOCYTES # BLD: 0 10E3/UL
IMM GRANULOCYTES NFR BLD: 0 %
LYMPHOCYTES # BLD AUTO: 1.8 10E3/UL (ref 0.8–5.3)
LYMPHOCYTES NFR BLD AUTO: 19 %
MCH RBC QN AUTO: 27.9 PG (ref 26.5–33)
MCHC RBC AUTO-ENTMCNC: 32.4 G/DL (ref 31.5–36.5)
MCV RBC AUTO: 86 FL (ref 78–100)
MONOCYTES # BLD AUTO: 0.5 10E3/UL (ref 0–1.3)
MONOCYTES NFR BLD AUTO: 5 %
MONOCYTES NFR BLD AUTO: NEGATIVE %
NEUTROPHILS # BLD AUTO: 6.6 10E3/UL (ref 1.6–8.3)
NEUTROPHILS NFR BLD AUTO: 71 %
PLATELET # BLD AUTO: 194 10E3/UL (ref 150–450)
POTASSIUM BLD-SCNC: 3.9 MMOL/L (ref 3.4–5.3)
PROT SERPL-MCNC: 7.1 G/DL (ref 6.8–8.8)
RBC # BLD AUTO: 4.73 10E6/UL (ref 3.8–5.2)
SODIUM SERPL-SCNC: 139 MMOL/L (ref 133–144)
TSH SERPL DL<=0.005 MIU/L-ACNC: 1.93 MU/L (ref 0.4–4)
WBC # BLD AUTO: 9.3 10E3/UL (ref 4–11)

## 2022-09-20 PROCEDURE — 86308 HETEROPHILE ANTIBODY SCREEN: CPT | Performed by: PHYSICIAN ASSISTANT

## 2022-09-20 PROCEDURE — 85652 RBC SED RATE AUTOMATED: CPT | Performed by: PHYSICIAN ASSISTANT

## 2022-09-20 PROCEDURE — 80050 GENERAL HEALTH PANEL: CPT | Performed by: PHYSICIAN ASSISTANT

## 2022-09-20 PROCEDURE — 36415 COLL VENOUS BLD VENIPUNCTURE: CPT | Performed by: PHYSICIAN ASSISTANT

## 2022-09-20 PROCEDURE — 99214 OFFICE O/P EST MOD 30 MIN: CPT | Performed by: PHYSICIAN ASSISTANT

## 2022-09-20 RX ORDER — MONTELUKAST SODIUM 10 MG/1
10 TABLET ORAL EVERY EVENING
COMMUNITY
Start: 2022-09-09 | End: 2022-09-20

## 2022-09-20 RX ORDER — HYDROXYZINE PAMOATE 25 MG/1
25-100 CAPSULE ORAL
COMMUNITY
Start: 2022-08-16 | End: 2023-10-18

## 2022-09-20 RX ORDER — MONTELUKAST SODIUM 10 MG/1
10 TABLET ORAL AT BEDTIME
Qty: 90 TABLET | Refills: 1 | Status: SHIPPED | OUTPATIENT
Start: 2022-09-20 | End: 2023-03-24

## 2022-09-20 ASSESSMENT — PAIN SCALES - GENERAL: PAINLEVEL: NO PAIN (0)

## 2022-09-20 NOTE — PROGRESS NOTES
"  Assessment & Plan     LA (lymphadenopathy)  Normal sed rate and CBC,  Negative mono test.  Rule out thyroid disease and check electrolytes and kidney and liver function  If all tests normal evaluate with CT   - CBC with platelets and differential  - Mononucleosis screen  - ESR: Erythrocyte sedimentation rate  - TSH with free T4 reflex  - Comprehensive metabolic panel (BMP + Alb, Alk Phos, ALT, AST, Total. Bili, TP)  - CBC with platelets and differential  - Mononucleosis screen  - ESR: Erythrocyte sedimentation rate  - TSH with free T4 reflex  - Comprehensive metabolic panel (BMP + Alb, Alk Phos, ALT, AST, Total. Bili, TP)  - CT Soft Tissue Neck w Contrast    Neck pain  As above   - CT Soft Tissue Neck w Contrast    Seasonal allergic rhinitis due to pollen  Refilled singulair   - montelukast (SINGULAIR) 10 MG tablet  Dispense: 90 tablet; Refill: 1      Review of the result(s) of each unique test - cbc. sed rate. ,mono  Ordering of each unique test  22 minutes spent on the date of the encounter doing chart review, history and exam, documentation and further activities per the note       BMI:   Estimated body mass index is 32.35 kg/m  as calculated from the following:    Height as of 2/23/22: 1.619 m (5' 3.75\").    Weight as of this encounter: 84.8 kg (187 lb).   Weight management plan: Discussed healthy diet and exercise guidelines    Patient Instructions   Someone will call with rest of lab results.  If all labs normal schedule CT soft tissue neck at [facility] ([phone]) formerly called [facility].    If all studies normal follow up with video visit with a provider       Return in about 1 week (around 9/27/2022), or if symptoms worsen or fail to improve, for in person.    Anum William PA-C  [facility] KERRY Packer is a 26 year old, presenting for the following health issues:  Mass      Mass    History of Present Illness       Reason " for visit:  Throat and neck pain  Symptom onset:  1-2 weeks ago  Symptoms include:  Neck feels swollen  Symptom intensity:  Moderate  Symptom progression:  Worsening  Had these symptoms before:  No  What makes it worse:  No  What makes it better:  Noo    She eats 2-3 servings of fruits and vegetables daily.She consumes 1 sweetened beverage(s) daily.She exercises with enough effort to increase her heart rate 60 or more minutes per day.  She exercises with enough effort to increase her heart rate 5 days per week.   She is taking medications regularly.   patient unknown to me presents with neck pain.  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  Just made appointment with rheumatology in January  Staying about the same  Pills for toenail fungus- got pregnant and never completed   Not breastfeeding  Trying to lose weight.  Was at 200 lb after second pregnancy.  Has had some success with Workouts and eating better   She is unsure how long has taken to lose weight.  Doesn't have periods due to kyleena iud         Review of Systems   Constitutional, HEENT, cardiovascular, pulmonary, gi and gu systems are negative, except as otherwise noted.      Objective    /68   Pulse 94   Temp 98.5  F (36.9  C) (Tympanic)   Resp 20   Wt 84.8 kg (187 lb)   SpO2 99%   BMI 32.35 kg/m    Body mass index is 32.35 kg/m .  Physical Exam   GENERAL: alert, no distress and obese  EYES: Eyes grossly normal to inspection, PERRL and conjunctivae and sclerae normal  HENT: ear canals and TM's normal, nose and mouth without ulcers or lesions  NECK: bilateral anterior and posterior cervical adenopathy, no asymmetry, masses, or scars and thyroid normal to palpation  RESP: lungs clear to auscultation  - no rales, rhonchi or wheezes  CV: regular rate and rhythm, normal S1 S2, no S3 or S4, no murmur, click or rub, no peripheral edema and peripheral pulses strong  ABDOMEN: soft, nontender, no hepatosplenomegaly, no masses and bowel sounds normal  MS: no gross musculoskeletal defects noted, no edema  LYMPH: supraclavicular: no adenopathy  axillary: no adenopathy  inguinal: no adenopathy  No hepato-splenomegaly    Results for orders placed or performed in visit on 09/20/22   Mononucleosis screen     Status: Normal   Result Value Ref Range    Mononucleosis Screen Negative Negative   ESR: Erythrocyte sedimentation rate     Status: Normal   Result Value Ref Range    Erythrocyte Sedimentation Rate 8 0 - 20 mm/hr   CBC with platelets and differential     Status: None   Result Value Ref Range    WBC Count 9.3 4.0 - 11.0 10e3/uL    RBC Count 4.73 3.80 - 5.20 10e6/uL    Hemoglobin 13.2 11.7 - 15.7 g/dL    Hematocrit 40.8 35.0 - 47.0 %    MCV 86 78 - 100 fL    MCH 27.9 26.5 - 33.0 pg    MCHC 32.4 31.5 - 36.5 g/dL    RDW 13.9 10.0 - 15.0 %    Platelet Count 194 150 - 450 10e3/uL    % Neutrophils 71 %    % Lymphocytes 19 %    % Monocytes 5 %    % Eosinophils 5 %    % Basophils 0 %    % Immature Granulocytes 0 %    Absolute Neutrophils 6.6 1.6 - 8.3 10e3/uL    Absolute Lymphocytes 1.8 0.8 - 5.3 10e3/uL    Absolute Monocytes 0.5 0.0 - 1.3 10e3/uL    Absolute Eosinophils 0.4 0.0 - 0.7 10e3/uL    Absolute Basophils 0.0 0.0 - 0.2 10e3/uL    Absolute Immature Granulocytes 0.0 <=0.4 10e3/uL   CBC with platelets and differential     Status: None    Narrative    The following orders were created for panel order CBC with platelets and differential.  Procedure                               Abnormality         Status                     ---------                               -----------         ------                     CBC with platelets and d...[[id number]]                      Final result                 Please view results for these  tests on the individual orders.

## 2022-09-20 NOTE — PATIENT INSTRUCTIONS
Someone will call with rest of lab results.  If all labs normal schedule CT soft tissue neck at [facility] ([phone]) formerly called [facility].    If all studies normal follow up with video visit with a provider

## 2022-09-21 ENCOUNTER — TELEPHONE (OUTPATIENT)
Dept: FAMILY MEDICINE | Facility: CLINIC | Age: 27
End: 2022-09-21

## 2022-09-21 NOTE — TELEPHONE ENCOUNTER
RN called patient to relay provider message in regards to labs. Patient voiced understanding and confirmation about scheduling CT scan.     No further questions at this time.    Delmi Wallace RN    [facility]

## 2022-09-21 NOTE — RESULT ENCOUNTER NOTE
Please call and advise that thyroid function was normal.  Your electrolytes, blood sugar, kidney function and liver function were normal.    Blood sugar was a little high but not concerning since not a fasting specimen  Inflammatory marker was normal.   Your blood counts and hemoglobin were normal.    Schedule CT at [facility] ([phone]) formerly called [facility].

## 2022-09-28 ENCOUNTER — ANCILLARY PROCEDURE (OUTPATIENT)
Dept: CT IMAGING | Facility: CLINIC | Age: 27
End: 2022-09-28
Attending: PHYSICIAN ASSISTANT
Payer: COMMERCIAL

## 2022-09-28 DIAGNOSIS — M54.2 NECK PAIN: ICD-10-CM

## 2022-09-28 DIAGNOSIS — R59.1 LA (LYMPHADENOPATHY): ICD-10-CM

## 2022-09-28 PROCEDURE — 70491 CT SOFT TISSUE NECK W/DYE: CPT | Mod: GC | Performed by: STUDENT IN AN ORGANIZED HEALTH CARE EDUCATION/TRAINING PROGRAM

## 2022-09-28 RX ORDER — IOPAMIDOL 755 MG/ML
100 INJECTION, SOLUTION INTRAVASCULAR ONCE
Status: COMPLETED | OUTPATIENT
Start: 2022-09-28 | End: 2022-09-28

## 2022-09-28 RX ADMIN — IOPAMIDOL 100 ML: 755 INJECTION, SOLUTION INTRAVASCULAR at 14:53

## 2022-09-29 DIAGNOSIS — E23.6 PITUITARY MASS (H): ICD-10-CM

## 2022-09-29 DIAGNOSIS — R93.89 ABNORMAL CT SCAN: Primary | ICD-10-CM

## 2022-09-29 LAB — RADIOLOGIST FLAGS: NORMAL

## 2022-09-29 NOTE — RESULT ENCOUNTER NOTE
Please call and advise that CT neck did not show abnormalities, however, did note something near the pituitary gland and recommend MRI pituitary gland - schedule at [facility] ([phone]) formerly called [facility].

## 2022-10-06 ENCOUNTER — TELEPHONE (OUTPATIENT)
Dept: FAMILY MEDICINE | Facility: CLINIC | Age: 27
End: 2022-10-06

## 2022-10-06 DIAGNOSIS — F41.9 ANXIETY: Primary | ICD-10-CM

## 2022-10-06 RX ORDER — DIAZEPAM 10 MG
10 TABLET ORAL ONCE
Qty: 1 TABLET | Refills: 0 | Status: SHIPPED | OUTPATIENT
Start: 2022-10-06 | End: 2022-10-06

## 2022-10-06 NOTE — TELEPHONE ENCOUNTER
Patient has MRI scheduled for Sunday. Patient is very anxious about the MRI, patient saw her therapist today and discussed her anxiety related to the MRI, patient is feeling very anxious  Patient was advised by her therapist to call her provider and ask for a medication for her anxiety      To provider to advise    Selin BARTH, RN

## 2022-10-06 NOTE — TELEPHONE ENCOUNTER
Pt notified via phone of provider message below as written. Pt indicates understanding of issues and agrees with the plan. Pt states she already has a  planned.    MARCY PalmerN, RN

## 2022-10-09 ENCOUNTER — ANCILLARY PROCEDURE (OUTPATIENT)
Dept: MRI IMAGING | Facility: CLINIC | Age: 27
End: 2022-10-09
Attending: PHYSICIAN ASSISTANT
Payer: COMMERCIAL

## 2022-10-09 DIAGNOSIS — E23.6 PITUITARY MASS (H): ICD-10-CM

## 2022-10-09 DIAGNOSIS — R93.89 ABNORMAL CT SCAN: ICD-10-CM

## 2022-10-09 PROCEDURE — 70553 MRI BRAIN STEM W/O & W/DYE: CPT | Mod: GC | Performed by: RADIOLOGY

## 2022-10-09 PROCEDURE — A9585 GADOBUTROL INJECTION: HCPCS | Performed by: RADIOLOGY

## 2022-10-09 RX ORDER — GADOBUTROL 604.72 MG/ML
0.1 INJECTION INTRAVENOUS ONCE
Status: COMPLETED | OUTPATIENT
Start: 2022-10-09 | End: 2022-10-09

## 2022-10-09 RX ADMIN — GADOBUTROL 8.48 ML: 604.72 INJECTION INTRAVENOUS at 13:58

## 2022-10-12 ENCOUNTER — TELEPHONE (OUTPATIENT)
Dept: FAMILY MEDICINE | Facility: CLINIC | Age: 27
End: 2022-10-12

## 2022-10-12 DIAGNOSIS — R90.89 ABNORMAL BRAIN MRI: Primary | ICD-10-CM

## 2022-10-12 NOTE — TELEPHONE ENCOUNTER
Please call and advise that MRI was not entirely  Normal but may not be anything to be concerned about.  Schedule  nonfasting lab only appointment at her earliest convenience.   Lab orders placed   I will call patient with results once labs are back

## 2022-10-12 NOTE — TELEPHONE ENCOUNTER
Called and spoke with patient, relayed results and provider plan, as noted below.  Patient voiced good understanding.    Patient noted that she was actually already aware of her MRI results. Had contacted a provider with [facility] and scheduled a visit with this provider in between seeing [facility] provider and performing scans. Patient said she was really nervous about her initial results at the start of her symptoms and had communicated this with her therapist and therapist had recommended patient connect with a [facility] provider, Rhona Aguilar CNP, which is what patient did. This provider relayed CT scan results, as well as MRI results (must be able to view in Epic or electronic record, like [facility] Everywhere) and has placed lab orders for patient to complete as well.    At this time, patient going to follow with [facility] provider, moving forward, regarding care plan and next steps for symptoms/problem.      Routing to provider as MATTY Gonzalez RN  [facility]

## 2023-03-13 ENCOUNTER — APPOINTMENT (OUTPATIENT)
Dept: OPTOMETRY | Facility: CLINIC | Age: 28
End: 2023-03-13
Payer: COMMERCIAL

## 2023-03-13 PROCEDURE — 92340 FIT SPECTACLES MONOFOCAL: CPT | Performed by: OPTOMETRIST

## 2023-03-24 DIAGNOSIS — J30.1 SEASONAL ALLERGIC RHINITIS DUE TO POLLEN: ICD-10-CM

## 2023-03-24 RX ORDER — MONTELUKAST SODIUM 10 MG/1
TABLET ORAL
Qty: 90 TABLET | Refills: 1 | Status: SHIPPED | OUTPATIENT
Start: 2023-03-24 | End: 2023-10-18

## 2023-10-18 ENCOUNTER — OFFICE VISIT (OUTPATIENT)
Dept: OPTOMETRY | Facility: CLINIC | Age: 28
End: 2023-10-18
Payer: COMMERCIAL

## 2023-10-18 DIAGNOSIS — H10.13 ALLERGIC CONJUNCTIVITIS OF BOTH EYES: ICD-10-CM

## 2023-10-18 DIAGNOSIS — Z01.00 EXAMINATION OF EYES AND VISION: Primary | ICD-10-CM

## 2023-10-18 DIAGNOSIS — H04.123 DRY EYE SYNDROME OF BOTH EYES: ICD-10-CM

## 2023-10-18 DIAGNOSIS — H52.223 REGULAR ASTIGMATISM OF BOTH EYES: ICD-10-CM

## 2023-10-18 DIAGNOSIS — H52.13 MYOPIA OF BOTH EYES: ICD-10-CM

## 2023-10-18 PROCEDURE — 92015 DETERMINE REFRACTIVE STATE: CPT | Performed by: OPTOMETRIST

## 2023-10-18 PROCEDURE — 92014 COMPRE OPH EXAM EST PT 1/>: CPT | Performed by: OPTOMETRIST

## 2023-10-18 RX ORDER — OLOPATADINE HYDROCHLORIDE 2 MG/ML
1 SOLUTION/ DROPS OPHTHALMIC DAILY
Qty: 2.5 ML | Refills: 11 | Status: SHIPPED | OUTPATIENT
Start: 2023-10-18

## 2023-10-18 RX ORDER — POLYETHYLENE GLYCOL 400 AND PROPYLENE GLYCOL 4; 3 MG/ML; MG/ML
1 SOLUTION/ DROPS OPHTHALMIC 4 TIMES DAILY
Qty: 6 ML | Refills: 12 | Status: SHIPPED | OUTPATIENT
Start: 2023-10-18

## 2023-10-18 ASSESSMENT — CONF VISUAL FIELD
OD_INFERIOR_NASAL_RESTRICTION: 0
OD_NORMAL: 1
OS_INFERIOR_NASAL_RESTRICTION: 0
OS_INFERIOR_TEMPORAL_RESTRICTION: 0
OD_INFERIOR_TEMPORAL_RESTRICTION: 0
OD_SUPERIOR_TEMPORAL_RESTRICTION: 0
OS_NORMAL: 1
OS_SUPERIOR_TEMPORAL_RESTRICTION: 0
OS_SUPERIOR_NASAL_RESTRICTION: 0
OD_SUPERIOR_NASAL_RESTRICTION: 0

## 2023-10-18 ASSESSMENT — REFRACTION_MANIFEST
OD_SPHERE: -3.00
OS_SPHERE: -3.75
OD_CYLINDER: +1.75
OD_AXIS: 091
OS_CYLINDER: +1.75
OS_AXIS: 088

## 2023-10-18 ASSESSMENT — VISUAL ACUITY
OS_CC: 20/20
OS_CC: 20/20
METHOD: SNELLEN - LINEAR
OS_CC+: -1
OS_SC: 20/150
OD_CC: 20/20
OD_CC: 20/20
OD_SC: 20/100
CORRECTION_TYPE: GLASSES
OD_SC+: -1

## 2023-10-18 ASSESSMENT — CUP TO DISC RATIO
OD_RATIO: 0.25
OS_RATIO: 0.2

## 2023-10-18 ASSESSMENT — REFRACTION_WEARINGRX
OS_AXIS: 088
OS_CYLINDER: +1.75
OD_CYLINDER: +1.50
SPECS_TYPE: SVL
OD_SPHERE: -3.00
OS_SPHERE: -3.75
OD_AXIS: 091

## 2023-10-18 ASSESSMENT — KERATOMETRY
OS_AXISANGLE2_DEGREES: 176
OD_AXISANGLE_DEGREES: 085
OD_K1POWER_DIOPTERS: 41.75
OS_K2POWER_DIOPTERS: 44.50
OS_AXISANGLE_DEGREES: 086
OD_AXISANGLE2_DEGREES: 175
OD_K2POWER_DIOPTERS: 44.25
OS_K1POWER_DIOPTERS: 42.00

## 2023-10-18 ASSESSMENT — TONOMETRY
OD_IOP_MMHG: 19
OS_IOP_MMHG: 18
IOP_METHOD: TONOPEN

## 2023-10-18 ASSESSMENT — EXTERNAL EXAM - RIGHT EYE: OD_EXAM: NORMAL

## 2023-10-18 ASSESSMENT — SLIT LAMP EXAM - LIDS
COMMENTS: NORMAL
COMMENTS: NORMAL

## 2023-10-18 ASSESSMENT — EXTERNAL EXAM - LEFT EYE: OS_EXAM: NORMAL

## 2023-10-18 NOTE — LETTER
10/18/2023         RE: Birdie Ellis  [address] [address]        Dear Colleague,    Thank you for referring your patient, Birdie Ellis, to the [facility]. Please see a copy of my visit note below.    Chief Complaint   Patient presents with     Annual Eye Exam         Last Eye Exam: 2-  Dilated Previously: Yes    What are you currently using to see?  glasses       Distance Vision Acuity: Noticed gradual change in both eyes    Near Vision Acuity: Satisfied with vision while reading  with glasses    Eye Comfort: good  Do you use eye drops? 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      Medical, surgical and family histories reviewed and updated 10/18/2023.       OBJECTIVE: See Ophthalmology exam    ASSESSMENT:    ICD-10-CM    1. Examination of eyes and vision  Z01.00 EYE EXAM (SIMPLE-NONBILLABLE)      2. Myopia of both eyes  H52.13 REFRACTION      3. Regular astigmatism of both eyes  H52.223 REFRACTION      4. Allergic conjunctivitis of both eyes  H10.13 EYE EXAM (SIMPLE-NONBILLABLE)     olopatadine (PATADAY) 0.2 % ophthalmic solution      5. Dry eye syndrome of both eyes  H04.123 EYE EXAM (SIMPLE-NONBILLABLE)     polyethylene glycol-propylene glycol (SYSTANE ULTRA) 0.4-0.3 % SOLN ophthalmic solution          PLAN:     Patient Instructions   Eyeglass prescription given.     Pataday to be used once daily for itchy eyes.  Use as needed. Contact your pharmacy for refills.    Systane Ultra 1 drop both eyes 2-4 x day.    Return in 1 year for a complete eye exam or sooner if needed.    Roger Polanco, OD         Again, thank you for allowing me to participate in the care of your patient.        Sincerely,        Roger Polanco, OD

## 2023-10-18 NOTE — PATIENT INSTRUCTIONS
Eyeglass prescription given.     Pataday to be used once daily for itchy eyes.  Use as needed. Contact your pharmacy for refills.    Systane Ultra 1 drop both eyes 2-4 x day.    Return in 1 year for a complete eye exam or sooner if needed.    Roger Polanco, ANGEL    The affects of the dilating drops last for 4- 6 hours.  You will be more sensitive to light and vision will be blurry up close.  Do not drive if you do not feel comfortable.  Mydriatic sunglasses were given if needed.      Optometry Providers       Clinic Locations                                 Telephone Number   Dr. Selena Lugo    [place]   [facility]  [place] [phone]     [place] [facility] Hours:                [place] [facility] Hours:       [place] [facility] Hours:   [address]   [address] [address]     [address]  [place] [address]   [place] [address]    [place] [address]  Phone: [phone]                    Phone: [phone]     Phone: [phone]                      Monday 8:00-6:00                          Monday 8:00-6:00                          Monday 8:00-6:00              Tuesday 8:00-6:00                          Tuesday 8:00-6:00                          Tuesday 8:00-6:00              Wednesday 8:00-6:00                  Wednesday 8:00-6:00                   Wednesday 8:00-6:00      Thursday 8:00-6:00                        Thursday 8:00-6:00                         Thursday 8:00-6:00            Friday 8:00-5:00                              Friday 8:00-5:00                              Friday 8:00-5:00    [place] [facility] Hours:   [address] [place] [address] [phone]    Monday 9:00-6:00  Tuesday 9:00-6:00  Wednesday 9:00-6:00  Thursday 9:00-6:00  Friday 9:00-5:00  As always, Thank you for trusting us with your health care needs!  There is a  combination of three treatments which can greatly improve symptoms of dry eyes.     Artificial tears  Heat (eyes closed)  Eyelid and eyelash cleansing (eyes closed)     Use one drop of artificial tears both eyes 4 x daily.  Once in the morning, lunch, dinner and bedtime. Continue to use the drops regardless if your eyes are comfortable or not.  Artificial tears work best as a preventative and not as well after your eyes are starting to bother you.  It may take 4- 6 weeks of using the drops before you notice improvement.  If after that time you are still having problems schedule an appointment for an evaluation and discussion of different treatments such as Restasis or Xiidra.  Dry eyes are a chronic condition and you may have more symptoms at certain times of the year.    Excess tearing can be due to the right tears not working properly or a blockage in the tear drainage system.  You can try using artificial tears 1 drop both eyes 4 x day.  If the excess tearing is bothersome after 4-6 weeks of treatment then we can send you for further testing.  This would entail a referral to our oculoplastic specialist Dr. Jeramy Bee at the [facility]-[phone].    Recommended brands are:    Systane Complete  Systane Ultra  Systane Balance  Refresh Advanced Optive  Refresh Relieva  Blink    Recommended brands for contact lens wearers are:    Systane contacts  Refresh contacts  Blink contacts    If you are using drops more than 4 x day or have sensitivities to preservatives I recommend non preserved artificial tears.  These come in 1 use vials.  They can be used every 1-2 hours.  Do not reuse the vials.    Recommended brands are:    Refresh Optive Jeff-3  Systane- preservative free  Refresh-  preservative free  Blink- preservative free    Gels or ointment can be used at night.    Recommended brands are:    Systane Gel  Refresh Gel  Blink Gel  Genteal Gel    Systane night time (ointment)  Refresh Celluvisc  Refresh PM  (ointment)    Optase dry eye spray.  Spray to eyelids 3-4 x daily.  This can be purchased on Amazon.      Visine, Clear Eyes or Murine (drops that get the red out) can irritate the eyes and cause a rebound effect where the eyes become more red and you end up using more drops.  Avoid drops containing tetrahydrozoline, naphazoline, phenylephrine, oxymetazoline.      OTC Lumify is a newer product that gives immediate redness relief without the rebound effect.  Use as needed to take the redness out.    Artificial tears may be used with other drops (such as allergy, glaucoma, antibiotics) around the same time.  Be sure to wait 5 minutes in between drops.    Heat to the eyelids can also improve your symptoms of dry eyes.  Christin heat masks can be purchased at Amazon to be used nightly for 10-15 minutes.  Other options are gel masks that can be put in the microwave and purchased at most pharmacies.      Tea Tree Oil eyelid cleansers recommended are Ocusoft Oust foam cleanser to cleanse eyelids/lashes at night and in the am. Other options are Blephadex or Cliradex eyelid wipes.  KEEP EYES CLOSED when using these products.  These can be purchased on amazon.com   A good product for make up remover with tea tree oil is WeLoveEyes.  This can be found at www.[employer] or [employer].    Other good eyelid cleansers have hypochlorous which removes excess bacteria and is safe around the eyes. Products are Avenova, Ocusoft Hypochlor or Heyedrate. Spray solution onto cotton pad, close eyes and gently apply to eyelids and eyelashes using side to side motion.  You can also KEEP EYES CLOSED spray and rub into eyelashes.  You do not need to rinse it off. Use morning and evening. These products can be found on Amazon.  You can check with your local pharmacy and see if they can order if for you if they don't have it.    Other brands of eyelid cleansing wipes are:    Ocusoft wipes  Systane wipes    A great eye make up line is  [email or web address]

## 2023-10-18 NOTE — PROGRESS NOTES
Chief Complaint   Patient presents with    Annual Eye Exam         Last Eye Exam: 2-  Dilated Previously: Yes    What are you currently using to see?  glasses       Distance Vision Acuity: Noticed gradual change in both eyes    Near Vision Acuity: Satisfied with vision while reading  with glasses    Eye Comfort: good  Do you use eye drops? 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      Medical, surgical and family histories reviewed and updated 10/18/2023.       OBJECTIVE: See Ophthalmology exam    ASSESSMENT:    ICD-10-CM    1. Examination of eyes and vision  Z01.00 EYE EXAM (SIMPLE-NONBILLABLE)      2. Myopia of both eyes  H52.13 REFRACTION      3. Regular astigmatism of both eyes  H52.223 REFRACTION      4. Allergic conjunctivitis of both eyes  H10.13 EYE EXAM (SIMPLE-NONBILLABLE)     olopatadine (PATADAY) 0.2 % ophthalmic solution      5. Dry eye syndrome of both eyes  H04.123 EYE EXAM (SIMPLE-NONBILLABLE)     polyethylene glycol-propylene glycol (SYSTANE ULTRA) 0.4-0.3 % SOLN ophthalmic solution          PLAN:     Patient Instructions   Eyeglass prescription given.     Pataday to be used once daily for itchy eyes.  Use as needed. Contact your pharmacy for refills.    Systane Ultra 1 drop both eyes 2-4 x day.    Return in 1 year for a complete eye exam or sooner if needed.    Roger Polanco, OD

## 2023-10-30 ENCOUNTER — APPOINTMENT (OUTPATIENT)
Dept: OPTOMETRY | Facility: CLINIC | Age: 28
End: 2023-10-30
Payer: COMMERCIAL

## 2023-10-30 PROCEDURE — 92340 FIT SPECTACLES MONOFOCAL: CPT | Performed by: OPTOMETRIST

## 2025-03-13 ENCOUNTER — OFFICE VISIT (OUTPATIENT)
Dept: OPTOMETRY | Facility: CLINIC | Age: 30
End: 2025-03-13
Payer: COMMERCIAL

## 2025-03-13 DIAGNOSIS — Z01.00 EXAMINATION OF EYES AND VISION: Primary | ICD-10-CM

## 2025-03-13 DIAGNOSIS — H52.223 REGULAR ASTIGMATISM OF BOTH EYES: ICD-10-CM

## 2025-03-13 DIAGNOSIS — H52.13 MYOPIA OF BOTH EYES: ICD-10-CM

## 2025-03-13 RX ORDER — PNV 119/IRON FUM/FOLIC ACID 29 MG-1 MG
TABLET ORAL
COMMUNITY

## 2025-03-13 RX ORDER — PYRIDOXINE HCL (VITAMIN B6) 25 MG
25 TABLET ORAL
COMMUNITY
Start: 2025-01-24

## 2025-03-13 RX ORDER — SERTRALINE HYDROCHLORIDE 25 MG/1
TABLET, FILM COATED ORAL
COMMUNITY
Start: 2025-02-23

## 2025-03-13 RX ORDER — ONDANSETRON 8 MG/1
1 TABLET, ORALLY DISINTEGRATING ORAL EVERY 8 HOURS PRN
COMMUNITY
Start: 2025-02-21

## 2025-03-13 RX ORDER — HYDROXYZINE HYDROCHLORIDE 25 MG/1
TABLET, FILM COATED ORAL
COMMUNITY
Start: 2024-11-27

## 2025-03-13 ASSESSMENT — CONF VISUAL FIELD
OD_INFERIOR_TEMPORAL_RESTRICTION: 0
OS_SUPERIOR_TEMPORAL_RESTRICTION: 0
OD_SUPERIOR_TEMPORAL_RESTRICTION: 0
OD_NORMAL: 1
OD_INFERIOR_NASAL_RESTRICTION: 0
OS_INFERIOR_NASAL_RESTRICTION: 0
OS_SUPERIOR_NASAL_RESTRICTION: 0
OS_INFERIOR_TEMPORAL_RESTRICTION: 0
OS_NORMAL: 1
OD_SUPERIOR_NASAL_RESTRICTION: 0

## 2025-03-13 ASSESSMENT — REFRACTION_MANIFEST
OD_SPHERE: -2.75
OD_SPHERE: -3.00
OD_AXIS: 091
OS_SPHERE: -3.50
METHOD_AUTOREFRACTION: 1
OS_SPHERE: -3.75
OS_AXIS: 083
OS_AXIS: 088
OS_CYLINDER: +1.75
OD_CYLINDER: +1.50
OS_CYLINDER: +2.00
OD_AXIS: 093
OD_CYLINDER: +1.75

## 2025-03-13 ASSESSMENT — EXTERNAL EXAM - LEFT EYE: OS_EXAM: NORMAL

## 2025-03-13 ASSESSMENT — TONOMETRY
OD_IOP_MMHG: 16.4
IOP_METHOD: ICARE
OS_IOP_MMHG: 18.2

## 2025-03-13 ASSESSMENT — SLIT LAMP EXAM - LIDS
COMMENTS: NORMAL
COMMENTS: NORMAL

## 2025-03-13 ASSESSMENT — REFRACTION_WEARINGRX
SPECS_TYPE: SVL
OS_CYLINDER: +1.75
OD_AXIS: 091
OD_SPHERE: -3.00
OS_SPHERE: -3.75
OD_CYLINDER: +1.75
OS_AXIS: 088

## 2025-03-13 ASSESSMENT — KERATOMETRY
OD_AXISANGLE_DEGREES: 085
OD_K1POWER_DIOPTERS: 42.00
OS_K1POWER_DIOPTERS: 42.00
OS_K2POWER_DIOPTERS: 44.50
OD_K2POWER_DIOPTERS: 44.25
OS_AXISANGLE_DEGREES: 086
OD_AXISANGLE2_DEGREES: 175
OS_AXISANGLE2_DEGREES: 176

## 2025-03-13 ASSESSMENT — VISUAL ACUITY
OD_CC: 20/20
OD_SC: 20/70
OD_CC: 20/20
OS_CC: 20/20
OS_SC: 20/125
CORRECTION_TYPE: GLASSES
OS_CC: 20/20
METHOD: SNELLEN - LINEAR

## 2025-03-13 ASSESSMENT — EXTERNAL EXAM - RIGHT EYE: OD_EXAM: NORMAL

## 2025-03-13 ASSESSMENT — CUP TO DISC RATIO
OS_RATIO: 0.2
OD_RATIO: 0.25

## 2025-03-13 NOTE — LETTER
3/13/2025      Birdie Ellis  [address]  [place] [address]      Dear Colleague,    Thank you for referring your patient, Birdie Ellis, to the [facility]. Please see a copy of my visit note below.    Chief Complaint   Patient presents with     Annual Eye Exam      Accompanied by son  Last Eye Exam: 10-  Dilated Previously: Yes    What are you currently using to see?  glasses       Distance Vision Acuity: Satisfied with vision    Near Vision Acuity: Satisfied with vision while reading  with glasses    Eye Comfort: good  Do you use eye drops? 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        Medical, surgical and family histories reviewed and updated 3/13/2025.       OBJECTIVE: See Ophthalmology exam    ASSESSMENT:    ICD-10-CM    1. Examination of eyes and vision  Z01.00       2. Myopia of both eyes  H52.13       3. Regular astigmatism of both eyes  H52.223           PLAN:     Patient Instructions   Eyeglass prescription given.  No change in eyeglass prescription.    Return in 1 year for a dilated eye exam or sooner if needed.    Roger Polanco, ANGEL             Again, thank you for allowing me to participate in the care of your patient.        Sincerely,        Roger Polanco OD    Electronically signed

## 2025-03-13 NOTE — PATIENT INSTRUCTIONS
Eyeglass prescription given.  No change in eyeglass prescription.    Return in 1 year for a dilated eye exam or sooner if needed.    Roger Polanco OD      Optometry Providers       Clinic Locations                                 Telephone Number   Dr. Selena Lugo    [place]   [facility]  [place] [phone]     [place] [facility] Hours:                [place] [facility] Hours:       [place] [facility] Hours:   [address]   [address]     [address]  [place] [address]   [place] [address]    [place] [address]  Phone: [phone]                    Phone: [phone]     Phone: [phone]                      Monday 8:00-6:00                          Monday 8:00-6:00                          Monday 8:00-6:00              Tuesday 8:00-6:00                          Tuesday 8:00-6:00                          Tuesday 8:00-6:00              Wednesday 8:00-6:00                  Wednesday 8:00-6:00                   Wednesday 8:00-6:00      Thursday 8:00-6:00                        Thursday 8:00-6:00                         Thursday 8:00-6:00            Friday 8:00-5:00                              Friday 8:00-5:00                              Friday 8:00-5:00    [place] [facility] Hours:   [address] [place] [address]  [phone]    Monday 9:00-6:00  Tuesday 9:00-6:00  Wednesday 9:00-6:00  Thursday 9:00-6:00  Friday 9:00-5:00  As always, Thank you for trusting us with your health care needs!

## 2025-03-13 NOTE — PROGRESS NOTES
Chief Complaint   Patient presents with    Annual Eye Exam      Accompanied by son  Last Eye Exam: 10-  Dilated Previously: Yes    What are you currently using to see?  glasses       Distance Vision Acuity: Satisfied with vision    Near Vision Acuity: Satisfied with vision while reading  with glasses    Eye Comfort: good  Do you use eye drops? 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       OBJECTIVE: See Ophthalmology exam    ASSESSMENT:    ICD-10-CM    1. Examination of eyes and vision  Z01.00       2. Myopia of both eyes  H52.13       3. Regular astigmatism of both eyes  H52.223           PLAN:     Patient Instructions   Eyeglass prescription given.  No change in eyeglass prescription.    Return in 1 year for a dilated eye exam or sooner if needed.    Roger Polanco, OD

## 2025-03-20 ENCOUNTER — APPOINTMENT (OUTPATIENT)
Dept: OPTOMETRY | Facility: CLINIC | Age: 30
End: 2025-03-20
Payer: COMMERCIAL

## 2025-03-20 PROCEDURE — 92340 FIT SPECTACLES MONOFOCAL: CPT | Performed by: OPTOMETRIST
